# Patient Record
Sex: FEMALE | Race: WHITE | NOT HISPANIC OR LATINO | Employment: OTHER | URBAN - METROPOLITAN AREA
[De-identification: names, ages, dates, MRNs, and addresses within clinical notes are randomized per-mention and may not be internally consistent; named-entity substitution may affect disease eponyms.]

---

## 2017-02-10 ENCOUNTER — OFFICE VISIT (OUTPATIENT)
Dept: FAMILY MEDICINE CLINIC | Facility: CLINIC | Age: 52
End: 2017-02-10

## 2017-02-10 VITALS
OXYGEN SATURATION: 98 % | DIASTOLIC BLOOD PRESSURE: 70 MMHG | BODY MASS INDEX: 26.32 KG/M2 | TEMPERATURE: 98 F | HEART RATE: 78 BPM | HEIGHT: 61 IN | SYSTOLIC BLOOD PRESSURE: 106 MMHG | WEIGHT: 139.4 LBS

## 2017-02-10 DIAGNOSIS — Z91.09 ENVIRONMENTAL ALLERGIES: Primary | ICD-10-CM

## 2017-02-10 DIAGNOSIS — Z00.00 ROUTINE GENERAL MEDICAL EXAMINATION AT A HEALTH CARE FACILITY: ICD-10-CM

## 2017-02-10 DIAGNOSIS — G43.909 MIGRAINE WITHOUT STATUS MIGRAINOSUS, NOT INTRACTABLE, UNSPECIFIED MIGRAINE TYPE: ICD-10-CM

## 2017-02-10 DIAGNOSIS — K21.9 GASTROESOPHAGEAL REFLUX DISEASE WITHOUT ESOPHAGITIS: ICD-10-CM

## 2017-02-10 DIAGNOSIS — E03.9 ADULT HYPOTHYROIDISM: ICD-10-CM

## 2017-02-10 LAB
ALBUMIN SERPL-MCNC: 4.3 G/DL (ref 3.5–5.2)
ALBUMIN/GLOB SERPL: 1.7 G/DL
ALP SERPL-CCNC: 101 U/L (ref 39–117)
ALT SERPL-CCNC: 36 U/L (ref 1–33)
AST SERPL-CCNC: 28 U/L (ref 1–32)
BASOPHILS # BLD AUTO: 0.03 10*3/MM3 (ref 0–0.2)
BASOPHILS NFR BLD AUTO: 0.5 % (ref 0–1.5)
BILIRUB SERPL-MCNC: 1.3 MG/DL (ref 0.1–1.2)
BUN SERPL-MCNC: 10 MG/DL (ref 6–20)
BUN/CREAT SERPL: 12.2 (ref 7–25)
CALCIUM SERPL-MCNC: 10 MG/DL (ref 8.6–10.5)
CHLORIDE SERPL-SCNC: 100 MMOL/L (ref 98–107)
CHOLEST SERPL-MCNC: 274 MG/DL (ref 0–200)
CO2 SERPL-SCNC: 28.9 MMOL/L (ref 22–29)
CREAT SERPL-MCNC: 0.82 MG/DL (ref 0.57–1)
EOSINOPHIL # BLD AUTO: 0.46 10*3/MM3 (ref 0–0.7)
EOSINOPHIL NFR BLD AUTO: 7.6 % (ref 0.3–6.2)
ERYTHROCYTE [DISTWIDTH] IN BLOOD BY AUTOMATED COUNT: 13 % (ref 11.7–13)
GLOBULIN SER CALC-MCNC: 2.5 GM/DL
GLUCOSE SERPL-MCNC: 85 MG/DL (ref 65–99)
HCT VFR BLD AUTO: 43.4 % (ref 35.6–45.5)
HDLC SERPL-MCNC: 70 MG/DL (ref 40–60)
HGB BLD-MCNC: 14.2 G/DL (ref 11.9–15.5)
IMM GRANULOCYTES # BLD: 0.02 10*3/MM3 (ref 0–0.03)
IMM GRANULOCYTES NFR BLD: 0.3 % (ref 0–0.5)
LDLC SERPL CALC-MCNC: 191 MG/DL (ref 0–100)
LDLC/HDLC SERPL: 2.73 {RATIO}
LYMPHOCYTES # BLD AUTO: 1.81 10*3/MM3 (ref 0.9–4.8)
LYMPHOCYTES NFR BLD AUTO: 30 % (ref 19.6–45.3)
MCH RBC QN AUTO: 29.6 PG (ref 26.9–32)
MCHC RBC AUTO-ENTMCNC: 32.7 G/DL (ref 32.4–36.3)
MCV RBC AUTO: 90.6 FL (ref 80.5–98.2)
MONOCYTES # BLD AUTO: 0.52 10*3/MM3 (ref 0.2–1.2)
MONOCYTES NFR BLD AUTO: 8.6 % (ref 5–12)
NEUTROPHILS # BLD AUTO: 3.19 10*3/MM3 (ref 1.9–8.1)
NEUTROPHILS NFR BLD AUTO: 53 % (ref 42.7–76)
PLATELET # BLD AUTO: 242 10*3/MM3 (ref 140–500)
POTASSIUM SERPL-SCNC: 4.2 MMOL/L (ref 3.5–5.2)
PROT SERPL-MCNC: 6.8 G/DL (ref 6–8.5)
RBC # BLD AUTO: 4.79 10*6/MM3 (ref 3.9–5.2)
SODIUM SERPL-SCNC: 140 MMOL/L (ref 136–145)
TRIGL SERPL-MCNC: 64 MG/DL (ref 0–150)
TSH SERPL DL<=0.005 MIU/L-ACNC: 0.46 MIU/ML (ref 0.27–4.2)
VLDLC SERPL CALC-MCNC: 12.8 MG/DL (ref 5–40)
WBC # BLD AUTO: 6.03 10*3/MM3 (ref 4.5–10.7)

## 2017-02-10 PROCEDURE — 99214 OFFICE O/P EST MOD 30 MIN: CPT | Performed by: NURSE PRACTITIONER

## 2017-02-10 RX ORDER — AZELASTINE 1 MG/ML
SPRAY, METERED NASAL
COMMUNITY
Start: 2013-04-12 | End: 2017-02-10 | Stop reason: SDUPTHER

## 2017-02-10 RX ORDER — AMPICILLIN TRIHYDRATE 250 MG
CAPSULE ORAL
COMMUNITY
Start: 2013-01-24 | End: 2018-04-03

## 2017-02-10 RX ORDER — LEVOCETIRIZINE DIHYDROCHLORIDE 5 MG/1
TABLET, FILM COATED ORAL
COMMUNITY
Start: 2013-01-24 | End: 2017-09-12 | Stop reason: SDUPTHER

## 2017-02-10 RX ORDER — OMEPRAZOLE 20 MG/1
20 CAPSULE, DELAYED RELEASE ORAL DAILY
Qty: 30 CAPSULE | Refills: 3 | Status: SHIPPED | OUTPATIENT
Start: 2017-02-10 | End: 2017-05-13 | Stop reason: SDUPTHER

## 2017-02-10 RX ORDER — BUTALBITAL, ACETAMINOPHEN AND CAFFEINE 50; 325; 40 MG/1; MG/1; MG/1
1 CAPSULE ORAL AS NEEDED
COMMUNITY

## 2017-02-10 RX ORDER — LEVOTHYROXINE SODIUM 0.07 MG/1
TABLET ORAL
COMMUNITY
Start: 2013-01-24 | End: 2017-03-24 | Stop reason: SDUPTHER

## 2017-02-10 RX ORDER — AZELASTINE 1 MG/ML
1 SPRAY, METERED NASAL 2 TIMES DAILY
Qty: 3 EACH | Refills: 3 | Status: SHIPPED | OUTPATIENT
Start: 2017-02-10 | End: 2018-03-28 | Stop reason: SDUPTHER

## 2017-02-10 NOTE — PROGRESS NOTES
"Subjective   Zi Jasso is a 51 y.o. female.     History of Present Illness   Zi Jasso 51 y.o. female who presents today for a new patient appointment.    she has a history of   Patient Active Problem List   Diagnosis   • Adult hypothyroidism   • Environmental allergies   • Migraine without status migrainosus, not intractable   • Gastroesophageal reflux disease without esophagitis   .  I reviewed the PFSH recorded today by my MA/LPN staff.   she is here to establish care.  She has been feeling well.    Sore Throat: Patient complains of sore throat. Associated symptoms include none.Onset of symptoms was 3 weeks ago, unchanged since that time. She is drinking plenty of fluids. She has not had recent close exposure to someone with proven streptococcal pharyngitis.  She does not have a hx of GERD.  It is consistent throughout the day and doesn't know if gets worse at night.     Hypothyroidism: Patient presents for evaluation of thyroid function. Symptoms consist of denies fatigue, weight changes, heat/cold intolerance, bowel/skin changes or CVS symptoms. Symptoms have present for several years. The symptoms are no.  The problem has been stable.  Previous thyroid studies include TSH. The hypothyroidism is due to hypothyroidism and Hashimoto's disease.    Also occasionally takes medication for migraines but hasn't had one in a very long time    Allergies are a big concern with her and she is taking Astelin which is helping     The following portions of the patient's history were reviewed and updated as appropriate: allergies, current medications, past social history and problem list.    Review of Systems   HENT: Positive for sore throat.    All other systems reviewed and are negative.      Objective   Visit Vitals   • /70 (BP Location: Right arm, Patient Position: Sitting)   • Pulse 78   • Temp 98 °F (36.7 °C)   • Ht 60.75\" (154.3 cm)   • Wt 139 lb 6.4 oz (63.2 kg)   • SpO2 98%   • BMI 26.56 kg/m2     Physical Exam "   Constitutional: She is oriented to person, place, and time. Vital signs are normal. She appears well-developed and well-nourished. No distress.   HENT:   Head: Normocephalic.   Right Ear: Tympanic membrane normal.   Left Ear: Tympanic membrane normal.   Nose: Nose normal.   Mouth/Throat: Oropharynx is clear and moist and mucous membranes are normal.   Cardiovascular: Normal rate, regular rhythm and normal heart sounds.    Pulmonary/Chest: Effort normal and breath sounds normal.   Lymphadenopathy:     She has no cervical adenopathy.   Neurological: She is alert and oriented to person, place, and time. Gait normal.   Psychiatric: She has a normal mood and affect. Her behavior is normal. Judgment and thought content normal.   Vitals reviewed.      Assessment/Plan   Problem List Items Addressed This Visit        Cardiovascular and Mediastinum    Migraine without status migrainosus, not intractable    Relevant Medications    Butalbital-APAP-Caffeine -40 MG per capsule       Digestive    Gastroesophageal reflux disease without esophagitis    Relevant Medications    omeprazole (PRILOSEC) 20 MG capsule       Endocrine    Adult hypothyroidism    Relevant Medications    levothyroxine (SYNTHROID) 75 MCG tablet    Other Relevant Orders    TSH       Other    Environmental allergies - Primary    Relevant Medications    azelastine (ASTELIN) 0.1 % nasal spray      Other Visit Diagnoses     Routine general medical examination at a health care facility        Relevant Orders    CBC & Differential    Comprehensive Metabolic Panel    Lipid Panel With LDL / HDL Ratio        Follow up after labs  rto in 4 weeks to check gerd med

## 2017-03-02 ENCOUNTER — TELEPHONE (OUTPATIENT)
Dept: FAMILY MEDICINE CLINIC | Facility: CLINIC | Age: 52
End: 2017-03-02

## 2017-03-02 DIAGNOSIS — Z12.31 SCREENING MAMMOGRAM, ENCOUNTER FOR: Primary | ICD-10-CM

## 2017-03-24 ENCOUNTER — HOSPITAL ENCOUNTER (OUTPATIENT)
Dept: MAMMOGRAPHY | Facility: HOSPITAL | Age: 52
Discharge: HOME | End: 2017-03-24
Admitting: NURSE PRACTITIONER

## 2017-03-24 DIAGNOSIS — Z12.31 SCREENING MAMMOGRAM, ENCOUNTER FOR: ICD-10-CM

## 2017-03-24 PROCEDURE — G0202 SCR MAMMO BI INCL CAD: HCPCS

## 2017-03-24 RX ORDER — LEVOTHYROXINE SODIUM 75 MCG
75 TABLET ORAL DAILY
Qty: 90 TABLET | Refills: 3 | Status: SHIPPED | OUTPATIENT
Start: 2017-03-24 | End: 2017-09-01 | Stop reason: SDUPTHER

## 2017-03-30 DIAGNOSIS — R92.2 BREAST DENSITY: Primary | ICD-10-CM

## 2017-04-05 ENCOUNTER — HOSPITAL ENCOUNTER (OUTPATIENT)
Dept: MAMMOGRAPHY | Facility: HOSPITAL | Age: 52
Discharge: HOME | End: 2017-04-05
Admitting: NURSE PRACTITIONER

## 2017-04-05 DIAGNOSIS — R92.2 BREAST DENSITY: ICD-10-CM

## 2017-04-05 PROCEDURE — G0206 DX MAMMO INCL CAD UNI: HCPCS

## 2017-04-29 ENCOUNTER — TELEPHONE (OUTPATIENT)
Dept: SPORTS MEDICINE | Facility: CLINIC | Age: 52
End: 2017-04-29

## 2017-04-29 RX ORDER — CIPROFLOXACIN 250 MG/1
250 TABLET, FILM COATED ORAL 2 TIMES DAILY
Qty: 6 TABLET | Refills: 0 | Status: SHIPPED | OUTPATIENT
Start: 2017-04-29 | End: 2017-09-01

## 2017-04-29 NOTE — TELEPHONE ENCOUNTER
Patient called with symptoms of UTI, going out of town Monday. E-rx'd cipr bid x 3 days, if not improved should be evaluated in person.

## 2017-05-13 DIAGNOSIS — K21.9 GASTROESOPHAGEAL REFLUX DISEASE WITHOUT ESOPHAGITIS: ICD-10-CM

## 2017-05-15 RX ORDER — OMEPRAZOLE 20 MG/1
CAPSULE, DELAYED RELEASE ORAL
Qty: 90 CAPSULE | Refills: 3 | Status: SHIPPED | OUTPATIENT
Start: 2017-05-15 | End: 2017-12-22 | Stop reason: SDUPTHER

## 2017-09-01 ENCOUNTER — OFFICE VISIT (OUTPATIENT)
Dept: FAMILY MEDICINE CLINIC | Facility: CLINIC | Age: 52
End: 2017-09-01

## 2017-09-01 VITALS
HEART RATE: 65 BPM | DIASTOLIC BLOOD PRESSURE: 64 MMHG | TEMPERATURE: 97.6 F | HEIGHT: 61 IN | OXYGEN SATURATION: 100 % | WEIGHT: 137.6 LBS | BODY MASS INDEX: 25.98 KG/M2 | SYSTOLIC BLOOD PRESSURE: 112 MMHG

## 2017-09-01 DIAGNOSIS — J32.9 SINUSITIS, UNSPECIFIED CHRONICITY, UNSPECIFIED LOCATION: Primary | ICD-10-CM

## 2017-09-01 DIAGNOSIS — E03.9 ADULT HYPOTHYROIDISM: ICD-10-CM

## 2017-09-01 PROCEDURE — 96372 THER/PROPH/DIAG INJ SC/IM: CPT | Performed by: NURSE PRACTITIONER

## 2017-09-01 PROCEDURE — 99213 OFFICE O/P EST LOW 20 MIN: CPT | Performed by: NURSE PRACTITIONER

## 2017-09-01 RX ORDER — LEVOTHYROXINE SODIUM 75 MCG
75 TABLET ORAL DAILY
Qty: 90 TABLET | Refills: 3 | Status: SHIPPED | OUTPATIENT
Start: 2017-09-01 | End: 2018-04-03 | Stop reason: SDUPTHER

## 2017-09-01 RX ORDER — TRIAMCINOLONE ACETONIDE 40 MG/ML
40 INJECTION, SUSPENSION INTRA-ARTICULAR; INTRAMUSCULAR ONCE
Status: COMPLETED | OUTPATIENT
Start: 2017-09-01 | End: 2017-09-01

## 2017-09-01 RX ORDER — PROMETHAZINE HYDROCHLORIDE AND CODEINE PHOSPHATE 6.25; 1 MG/5ML; MG/5ML
5 SYRUP ORAL EVERY 4 HOURS PRN
Qty: 280 ML | Refills: 0 | Status: SHIPPED | OUTPATIENT
Start: 2017-09-01 | End: 2017-11-13

## 2017-09-01 RX ORDER — AZITHROMYCIN 250 MG/1
TABLET, FILM COATED ORAL
Qty: 6 TABLET | Refills: 0 | Status: SHIPPED | OUTPATIENT
Start: 2017-09-01 | End: 2017-11-13

## 2017-09-01 RX ADMIN — TRIAMCINOLONE ACETONIDE 40 MG: 40 INJECTION, SUSPENSION INTRA-ARTICULAR; INTRAMUSCULAR at 13:50

## 2017-09-01 NOTE — PROGRESS NOTES
"Subjective   Zi Jasso is a 52 y.o. female.     History of Present Illness   Upper Respiratory Infection: Patient complains of symptoms of a URI, possible sinusitis. Symptoms include congestion, cough, plugged sensation in both ears, sore throat and swollen glands. Onset of symptoms was 9 days ago, gradually worsening since that time. She also c/o facial pain for the past 3 days .  She is drinking plenty of fluids. Evaluation to date: none. Treatment to date: antihistamines.      Also needing thyroid med refilled. Doing well with that   The following portions of the patient's history were reviewed and updated as appropriate: allergies, current medications, past social history and problem list.    Review of Systems   HENT: Positive for congestion, postnasal drip and sore throat.    Respiratory: Positive for cough.    All other systems reviewed and are negative.      Objective   /64 (BP Location: Right arm, Patient Position: Sitting)  Pulse 65  Temp 97.6 °F (36.4 °C)  Ht 60.75\" (154.3 cm)  Wt 137 lb 9.6 oz (62.4 kg)  SpO2 100%  BMI 26.21 kg/m2  Physical Exam   Constitutional: She is oriented to person, place, and time. Vital signs are normal. She appears well-developed and well-nourished. No distress.   HENT:   Head: Normocephalic.   Cardiovascular: Normal rate, regular rhythm and normal heart sounds.    Pulmonary/Chest: Effort normal and breath sounds normal.   Neurological: She is alert and oriented to person, place, and time. Gait normal.   Psychiatric: She has a normal mood and affect. Her behavior is normal. Judgment and thought content normal.   Vitals reviewed.      Assessment/Plan   Problem List Items Addressed This Visit        Respiratory    Sinusitis - Primary    Relevant Medications    azithromycin (ZITHROMAX Z-ROYAL) 250 MG tablet    promethazine-codeine (PHENERGAN with CODEINE) 6.25-10 MG/5ML syrup    triamcinolone acetonide (KENALOG-40) injection 40 mg (Start on 9/1/2017  2:15 PM)       " Endocrine    Adult hypothyroidism    Relevant Medications    SYNTHROID 75 MCG tablet           rto prn

## 2017-09-12 RX ORDER — LEVOCETIRIZINE DIHYDROCHLORIDE 5 MG/1
5 TABLET, FILM COATED ORAL DAILY
Qty: 90 TABLET | Refills: 3 | Status: SHIPPED | OUTPATIENT
Start: 2017-09-12

## 2017-11-13 ENCOUNTER — OFFICE VISIT (OUTPATIENT)
Dept: FAMILY MEDICINE CLINIC | Facility: CLINIC | Age: 52
End: 2017-11-13

## 2017-11-13 VITALS
HEART RATE: 74 BPM | TEMPERATURE: 97.6 F | BODY MASS INDEX: 26.21 KG/M2 | SYSTOLIC BLOOD PRESSURE: 104 MMHG | DIASTOLIC BLOOD PRESSURE: 68 MMHG | OXYGEN SATURATION: 99 % | HEIGHT: 61 IN | WEIGHT: 138.8 LBS

## 2017-11-13 DIAGNOSIS — N64.4 BREAST PAIN: ICD-10-CM

## 2017-11-13 DIAGNOSIS — J32.9 SINUSITIS, UNSPECIFIED CHRONICITY, UNSPECIFIED LOCATION: Primary | ICD-10-CM

## 2017-11-13 PROCEDURE — 99214 OFFICE O/P EST MOD 30 MIN: CPT | Performed by: NURSE PRACTITIONER

## 2017-11-13 RX ORDER — PROMETHAZINE HYDROCHLORIDE AND CODEINE PHOSPHATE 6.25; 1 MG/5ML; MG/5ML
5 SYRUP ORAL EVERY 4 HOURS PRN
Qty: 240 ML | Refills: 0 | Status: SHIPPED | OUTPATIENT
Start: 2017-11-13 | End: 2017-11-20

## 2017-11-13 RX ORDER — DOXYCYCLINE HYCLATE 100 MG
100 TABLET ORAL 2 TIMES DAILY
Qty: 20 TABLET | Refills: 0 | Status: SHIPPED | OUTPATIENT
Start: 2017-11-13 | End: 2018-03-12

## 2017-11-13 RX ORDER — METHYLPREDNISOLONE 4 MG/1
TABLET ORAL
Qty: 1 EACH | Refills: 0 | Status: SHIPPED | OUTPATIENT
Start: 2017-11-13 | End: 2017-11-20

## 2017-11-20 ENCOUNTER — OFFICE VISIT (OUTPATIENT)
Dept: FAMILY MEDICINE CLINIC | Facility: CLINIC | Age: 52
End: 2017-11-20

## 2017-11-20 VITALS
OXYGEN SATURATION: 98 % | SYSTOLIC BLOOD PRESSURE: 130 MMHG | HEART RATE: 61 BPM | DIASTOLIC BLOOD PRESSURE: 66 MMHG | BODY MASS INDEX: 26.39 KG/M2 | HEIGHT: 61 IN | TEMPERATURE: 98.3 F | WEIGHT: 139.8 LBS

## 2017-11-20 DIAGNOSIS — R07.9 CHEST PAIN, UNSPECIFIED TYPE: ICD-10-CM

## 2017-11-20 DIAGNOSIS — N64.4 BREAST PAIN: ICD-10-CM

## 2017-11-20 DIAGNOSIS — E78.5 HYPERLIPIDEMIA, UNSPECIFIED HYPERLIPIDEMIA TYPE: Primary | ICD-10-CM

## 2017-11-20 PROCEDURE — 71020 XR CHEST PA AND LATERAL: CPT | Performed by: NURSE PRACTITIONER

## 2017-11-20 PROCEDURE — 99214 OFFICE O/P EST MOD 30 MIN: CPT | Performed by: NURSE PRACTITIONER

## 2017-11-20 NOTE — PROGRESS NOTES
"Subjective   Zi Jasso is a 52 y.o. female.     History of Present Illness   Patient presenting back to the office today to follow-up on chest pain pressure and sinus infection I saw her for about 2 weeks ago.  Patient states that the pain in her left breast is no longer reproducible when she pushes on it but it is still present under her left breast and going over towards the right.  She states she has not exerted herself at all so she doesn't know if he gets worse with exertion but she has walked upstairs and does not get short of breath or chest pain with that.  Patient states that the pain still comes and goes she's not having any heartburn or acid reflux.  The following portions of the patient's history were reviewed and updated as appropriate: allergies, current medications, past social history and problem list.    Review of Systems   HENT: Positive for congestion, postnasal drip and sinus pressure.    Cardiovascular: Positive for chest pain.   All other systems reviewed and are negative.      Objective   /66 (BP Location: Left arm, Patient Position: Sitting)  Pulse 61  Temp 98.3 °F (36.8 °C)  Ht 60.75\" (154.3 cm)  Wt 139 lb 12.8 oz (63.4 kg)  SpO2 98%  BMI 26.63 kg/m2  Physical Exam   Constitutional: She is oriented to person, place, and time. Vital signs are normal. She appears well-developed and well-nourished. No distress.   HENT:   Head: Normocephalic.   Cardiovascular: Normal rate, regular rhythm and normal heart sounds.    Pulmonary/Chest: Effort normal and breath sounds normal.   Neurological: She is alert and oriented to person, place, and time. Gait normal.   Psychiatric: She has a normal mood and affect. Her behavior is normal. Judgment and thought content normal.   Vitals reviewed.    Xray- chest    Findings-normal  No comparison    Assessment/Plan   Problem List Items Addressed This Visit     None      Visit Diagnoses     Hyperlipidemia, unspecified hyperlipidemia type    -  Primary    " Relevant Orders    Lipid Panel With LDL / HDL Ratio    Chest pain, unspecified type        Relevant Orders    XR Chest PA & Lateral           patient is return to the office tomorrow fasting for lipid panel.  Patient is to go to the emergency room or report back here if she has any chest pain or shortness breath with exertion.  I was unable to get an EKG in the office today due to not having the proper supplies.  I would like to go ahead and proceed with a breast MRI follow-up after that.

## 2017-11-21 LAB
CHOLEST SERPL-MCNC: 315 MG/DL (ref 0–200)
HDLC SERPL-MCNC: 78 MG/DL (ref 40–60)
LDLC SERPL CALC-MCNC: 224 MG/DL (ref 0–100)
LDLC/HDLC SERPL: 2.87 {RATIO}
TRIGL SERPL-MCNC: 66 MG/DL (ref 0–150)
VLDLC SERPL CALC-MCNC: 13.2 MG/DL (ref 5–40)

## 2017-11-22 RX ORDER — ROSUVASTATIN CALCIUM 20 MG/1
20 TABLET, COATED ORAL DAILY
Qty: 90 TABLET | Refills: 3 | Status: SHIPPED | OUTPATIENT
Start: 2017-11-22 | End: 2018-03-12

## 2017-12-11 ENCOUNTER — HOSPITAL ENCOUNTER (OUTPATIENT)
Dept: MRI IMAGING | Facility: HOSPITAL | Age: 52
Discharge: HOME | End: 2017-12-11
Admitting: NURSE PRACTITIONER

## 2017-12-11 DIAGNOSIS — N64.4 BREAST PAIN: ICD-10-CM

## 2017-12-11 DIAGNOSIS — N64.4 BREAST PAIN: Primary | ICD-10-CM

## 2017-12-11 PROCEDURE — A9577 INJ MULTIHANCE: HCPCS | Performed by: NURSE PRACTITIONER

## 2017-12-11 PROCEDURE — 0159T HC MRI BREAST COMPUTER ANALYSIS: CPT

## 2017-12-11 PROCEDURE — 0 GADOBENATE DIMEGLUMINE 529 MG/ML SOLUTION: Performed by: NURSE PRACTITIONER

## 2017-12-11 PROCEDURE — 82565 ASSAY OF CREATININE: CPT

## 2017-12-11 PROCEDURE — C8908 MRI W/O FOL W/CONT, BREAST,: HCPCS

## 2017-12-11 RX ADMIN — GADOBENATE DIMEGLUMINE 12 ML: 529 INJECTION, SOLUTION INTRAVENOUS at 16:24

## 2017-12-12 LAB — CREAT BLDA-MCNC: 0.8 MG/DL (ref 0.6–1.3)

## 2017-12-22 DIAGNOSIS — K21.9 GASTROESOPHAGEAL REFLUX DISEASE WITHOUT ESOPHAGITIS: ICD-10-CM

## 2017-12-22 RX ORDER — OMEPRAZOLE 20 MG/1
20 CAPSULE, DELAYED RELEASE ORAL DAILY
Qty: 90 CAPSULE | Refills: 3 | Status: SHIPPED | OUTPATIENT
Start: 2017-12-22 | End: 2018-03-12 | Stop reason: ALTCHOICE

## 2018-02-13 ENCOUNTER — TELEPHONE (OUTPATIENT)
Dept: FAMILY MEDICINE CLINIC | Facility: CLINIC | Age: 53
End: 2018-02-13

## 2018-02-13 NOTE — TELEPHONE ENCOUNTER
Follow up call with patient     Patient still having concerns with chest pain and would like to be referred for this. Patient requesting names only as she would like to schedule her own appointment.    Please advise.

## 2018-03-12 ENCOUNTER — OFFICE VISIT (OUTPATIENT)
Dept: FAMILY MEDICINE CLINIC | Facility: CLINIC | Age: 53
End: 2018-03-12

## 2018-03-12 VITALS
TEMPERATURE: 98 F | WEIGHT: 136 LBS | BODY MASS INDEX: 25.68 KG/M2 | HEIGHT: 61 IN | OXYGEN SATURATION: 98 % | SYSTOLIC BLOOD PRESSURE: 104 MMHG | DIASTOLIC BLOOD PRESSURE: 64 MMHG | HEART RATE: 84 BPM

## 2018-03-12 DIAGNOSIS — R07.9 CHEST PAIN, UNSPECIFIED TYPE: Primary | ICD-10-CM

## 2018-03-12 DIAGNOSIS — E78.5 HYPERLIPIDEMIA, UNSPECIFIED HYPERLIPIDEMIA TYPE: ICD-10-CM

## 2018-03-12 PROBLEM — J32.9 SINUSITIS: Status: RESOLVED | Noted: 2017-09-01 | Resolved: 2018-03-12

## 2018-03-12 PROBLEM — N64.4 BREAST PAIN: Status: RESOLVED | Noted: 2017-11-13 | Resolved: 2018-03-12

## 2018-03-12 PROCEDURE — 99214 OFFICE O/P EST MOD 30 MIN: CPT | Performed by: NURSE PRACTITIONER

## 2018-03-12 PROCEDURE — 93000 ELECTROCARDIOGRAM COMPLETE: CPT | Performed by: NURSE PRACTITIONER

## 2018-03-12 RX ORDER — ROSUVASTATIN CALCIUM 40 MG/1
40 TABLET, COATED ORAL DAILY
Qty: 90 TABLET | Refills: 3 | Status: SHIPPED | OUTPATIENT
Start: 2018-03-12 | End: 2018-04-03

## 2018-03-12 RX ORDER — OMEPRAZOLE 40 MG/1
40 CAPSULE, DELAYED RELEASE ORAL DAILY
Qty: 90 CAPSULE | Refills: 3 | Status: SHIPPED | OUTPATIENT
Start: 2018-03-12 | End: 2018-04-03 | Stop reason: SINTOL

## 2018-03-12 NOTE — PROGRESS NOTES
"Subjective   Zi Jasso is a 52 y.o. female.     History of Present Illness   Patient presenting to the office today with continued and ongoing chest pain and pressure that she's had since November when I last saw her for this.  She did not start the Crestor medication or follow-up regarding that when I spoke to her about her elevated cholesterol 3:15 back in November.  She does state she's doing red yeast rice causes her to have muscle aches that she doesn't do it consistently.  She states high cholesterol does run in her family.  She states the Prilosec helps with a sore throat but isn't nothing to help with the chest pressure.  Today she states that the chest pressure does get worse with exertion like walking up stairs.  Last visit records were reviewed.  I was unable to do EKG in office at that time because of lack of supplies.  Chest x-ray at that visit was normal.    The following portions of the patient's history were reviewed and updated as appropriate: allergies, current medications, past social history and problem list.    Review of Systems   Respiratory: Positive for cough and shortness of breath.    All other systems reviewed and are negative.      Objective   /64 (BP Location: Right arm, Patient Position: Sitting)   Pulse 84   Temp 98 °F (36.7 °C)   Ht 154.3 cm (60.75\")   Wt 61.7 kg (136 lb)   SpO2 98%   BMI 25.91 kg/m²   Physical Exam   Constitutional: She is oriented to person, place, and time. Vital signs are normal. She appears well-developed and well-nourished. No distress.   HENT:   Head: Normocephalic.   Cardiovascular: Normal rate, regular rhythm and normal heart sounds.    Pulmonary/Chest: Effort normal and breath sounds normal.   Neurological: She is alert and oriented to person, place, and time. Gait normal.   Psychiatric: She has a normal mood and affect. Her behavior is normal. Judgment and thought content normal.   Vitals reviewed.      ECG 12 Lead  Date/Time: 3/12/2018 11:15 " AM  Performed by: LASHAWN CRUZ  Authorized by: LASHAWN CRUZ   Comparison: not compared with previous ECG   Previous ECG: no previous ECG available  Rhythm: sinus rhythm  Rate: normal  Conduction: conduction normal  ST Segments: ST segments normal  T Waves: T waves normal  QRS axis: normal  Other: no other findings  Clinical impression: normal ECG            Assessment/Plan   Problem List Items Addressed This Visit        Cardiovascular and Mediastinum    Hyperlipidemia       Nervous and Auditory    Chest pain - Primary    Relevant Orders    ECG 12 Lead    Treadmill Stress Test      Other Visit Diagnoses    None.       I've talked at length regarding the need for statin medication. She is willing to try.  Recheck lipids in 3 mons  No exercising until after stress test  Increase prilosec to 40mg

## 2018-03-15 ENCOUNTER — HOSPITAL ENCOUNTER (OUTPATIENT)
Dept: CARDIOLOGY | Facility: HOSPITAL | Age: 53
Discharge: HOME | End: 2018-03-15
Admitting: NURSE PRACTITIONER

## 2018-03-15 ENCOUNTER — TELEPHONE (OUTPATIENT)
Dept: FAMILY MEDICINE CLINIC | Facility: CLINIC | Age: 53
End: 2018-03-15

## 2018-03-15 DIAGNOSIS — R07.9 CHEST PAIN, UNSPECIFIED TYPE: ICD-10-CM

## 2018-03-15 LAB
BH CV STRESS BP STAGE 1: NORMAL
BH CV STRESS BP STAGE 2: NORMAL
BH CV STRESS BP STAGE 3: NORMAL
BH CV STRESS BP STAGE 4: NORMAL
BH CV STRESS DURATION MIN STAGE 1: 3
BH CV STRESS DURATION MIN STAGE 2: 3
BH CV STRESS DURATION MIN STAGE 3: 3
BH CV STRESS DURATION MIN STAGE 4: 1
BH CV STRESS DURATION SEC STAGE 1: 0
BH CV STRESS DURATION SEC STAGE 2: 0
BH CV STRESS DURATION SEC STAGE 3: 0
BH CV STRESS DURATION SEC STAGE 4: 20
BH CV STRESS GRADE STAGE 1: 10
BH CV STRESS GRADE STAGE 2: 12
BH CV STRESS GRADE STAGE 3: 14
BH CV STRESS GRADE STAGE 4: 16
BH CV STRESS HR STAGE 1: 104
BH CV STRESS HR STAGE 2: 119
BH CV STRESS HR STAGE 3: 127
BH CV STRESS HR STAGE 4: 156
BH CV STRESS METS STAGE 1: 5
BH CV STRESS METS STAGE 2: 7.5
BH CV STRESS METS STAGE 3: 10
BH CV STRESS METS STAGE 4: 13.5
BH CV STRESS PROTOCOL 1: NORMAL
BH CV STRESS RECOVERY BP: NORMAL MMHG
BH CV STRESS RECOVERY HR: 92 BPM
BH CV STRESS SPEED STAGE 1: 1.7
BH CV STRESS SPEED STAGE 2: 2.5
BH CV STRESS SPEED STAGE 3: 3.4
BH CV STRESS SPEED STAGE 4: 4.2
BH CV STRESS STAGE 1: 1
BH CV STRESS STAGE 2: 2
BH CV STRESS STAGE 3: 3
BH CV STRESS STAGE 4: 4
MAXIMAL PREDICTED HEART RATE: 167 BPM
PERCENT MAX PREDICTED HR: 93.41 %
STRESS BASELINE BP: NORMAL MMHG
STRESS BASELINE HR: 64 BPM
STRESS PERCENT HR: 110 %
STRESS POST ESTIMATED WORKLOAD: 11.1 METS
STRESS POST EXERCISE DUR MIN: 10 MIN
STRESS POST EXERCISE DUR SEC: 20 SEC
STRESS POST PEAK BP: NORMAL MMHG
STRESS POST PEAK HR: 156 BPM
STRESS TARGET HR: 142 BPM

## 2018-03-15 PROCEDURE — 93017 CV STRESS TEST TRACING ONLY: CPT

## 2018-03-15 PROCEDURE — 93016 CV STRESS TEST SUPVJ ONLY: CPT | Performed by: INTERNAL MEDICINE

## 2018-03-15 PROCEDURE — 93018 CV STRESS TEST I&R ONLY: CPT | Performed by: INTERNAL MEDICINE

## 2018-03-15 NOTE — TELEPHONE ENCOUNTER
Patient received her stress results since they are normal patient wants to know if she can start exercising again? Is there anything else she needs to do?

## 2018-03-16 DIAGNOSIS — R07.9 CHEST PAIN, EXERTIONAL: Primary | ICD-10-CM

## 2018-03-16 RX ORDER — PANTOPRAZOLE SODIUM 40 MG/1
40 TABLET, DELAYED RELEASE ORAL DAILY
Qty: 90 TABLET | Refills: 3 | Status: SHIPPED | OUTPATIENT
Start: 2018-03-16 | End: 2018-04-03 | Stop reason: SINTOL

## 2018-03-16 NOTE — TELEPHONE ENCOUNTER
We really don't need to repeat labs until she has been on the statin for 3 months.  She needs to cont that med and lets switch the prilosec to protonix 40mg #90 1 po qd rf 3   I suggest referring to pulmonary to do testing for the chest pain with exertion

## 2018-03-16 NOTE — TELEPHONE ENCOUNTER
Called and spoke to patient and told her what claudine said referral is in and prescription sent to pharmacy

## 2018-03-16 NOTE — TELEPHONE ENCOUNTER
Patient wants to make sure that claudine does want to switch the stomach medicine and not the cholesterol medication because she says the cholesterol medication is the one that is new and the one she thinks is causing the problems she said the prilosec has been doing fine

## 2018-03-16 NOTE — TELEPHONE ENCOUNTER
Your chest pain can be coming from GERD so I want to switch your med to something stronger to see if it helps your chest pain.  If you can please cont with cholesterol med bc it usually doesn't have that SE and if still bothering you after 2 weeks call

## 2018-03-16 NOTE — TELEPHONE ENCOUNTER
Patient is wanting to go ahead and investigate the problem. She is wanting to know if she can come in today just to have labs drawn. Patient states the new medication claudine put her on she thinks is causing more issues than helping. Her stomach is very gassy and cramping

## 2018-03-26 RX ORDER — LEVOTHYROXINE SODIUM 75 MCG
75 TABLET ORAL DAILY
Qty: 90 TABLET | Refills: 0 | Status: SHIPPED | OUTPATIENT
Start: 2018-03-26 | End: 2018-08-15

## 2018-03-28 DIAGNOSIS — Z91.09 ENVIRONMENTAL ALLERGIES: ICD-10-CM

## 2018-03-28 RX ORDER — AZELASTINE 1 MG/ML
SPRAY, METERED NASAL
Qty: 90 ML | Refills: 0 | Status: SHIPPED | OUTPATIENT
Start: 2018-03-28 | End: 2019-12-02 | Stop reason: SDUPTHER

## 2018-04-03 ENCOUNTER — OFFICE VISIT (OUTPATIENT)
Dept: FAMILY MEDICINE CLINIC | Facility: CLINIC | Age: 53
End: 2018-04-03

## 2018-04-03 VITALS
SYSTOLIC BLOOD PRESSURE: 108 MMHG | DIASTOLIC BLOOD PRESSURE: 66 MMHG | BODY MASS INDEX: 25.75 KG/M2 | OXYGEN SATURATION: 97 % | HEIGHT: 61 IN | TEMPERATURE: 97.9 F | HEART RATE: 69 BPM | WEIGHT: 136.4 LBS

## 2018-04-03 DIAGNOSIS — E78.5 HYPERLIPIDEMIA, UNSPECIFIED HYPERLIPIDEMIA TYPE: Primary | ICD-10-CM

## 2018-04-03 DIAGNOSIS — R31.9 URINARY TRACT INFECTION WITH HEMATURIA, SITE UNSPECIFIED: ICD-10-CM

## 2018-04-03 DIAGNOSIS — N39.0 URINARY TRACT INFECTION WITH HEMATURIA, SITE UNSPECIFIED: ICD-10-CM

## 2018-04-03 DIAGNOSIS — E03.9 ADULT HYPOTHYROIDISM: ICD-10-CM

## 2018-04-03 LAB
BILIRUB BLD-MCNC: NEGATIVE MG/DL
CLARITY, POC: CLEAR
COLOR UR: YELLOW
GLUCOSE UR STRIP-MCNC: NEGATIVE MG/DL
KETONES UR QL: NEGATIVE
LEUKOCYTE EST, POC: ABNORMAL
NITRITE UR-MCNC: NEGATIVE MG/ML
PH UR: 5.5 [PH] (ref 5–8)
PROT UR STRIP-MCNC: NEGATIVE MG/DL
RBC # UR STRIP: ABNORMAL /UL
SP GR UR: 1.03 (ref 1–1.03)
TSH SERPL DL<=0.005 MIU/L-ACNC: 0.29 MIU/ML (ref 0.27–4.2)
UROBILINOGEN UR QL: NORMAL

## 2018-04-03 PROCEDURE — 81003 URINALYSIS AUTO W/O SCOPE: CPT | Performed by: NURSE PRACTITIONER

## 2018-04-03 PROCEDURE — 99213 OFFICE O/P EST LOW 20 MIN: CPT | Performed by: NURSE PRACTITIONER

## 2018-04-03 RX ORDER — SULFAMETHOXAZOLE AND TRIMETHOPRIM 800; 160 MG/1; MG/1
1 TABLET ORAL 2 TIMES DAILY
Qty: 10 TABLET | Refills: 0 | Status: SHIPPED | OUTPATIENT
Start: 2018-04-03 | End: 2018-06-21

## 2018-04-03 NOTE — PROGRESS NOTES
"Subjective   Zi Jasso is a 52 y.o. female.     History of Present Illness   Patient presenting to the office today to follow-up on chest pain.  To this date we have done an x-ray of her chest which was normal treadmill stress test which was normal and EKG which was normal she has a pulmonary consultation next week.  She states the chest pain is getting better.  She is completely stopped taking her Prilosec and her cholesterol medication due to side effects.  She was unable to tolerate the cholesterol medication due to multiple muscle aches throughout her body.    She's also been having some burning with urination for the past 4 days of fever no back pain  The following portions of the patient's history were reviewed and updated as appropriate: allergies, current medications, past social history and problem list.    Review of Systems   Cardiovascular: Positive for chest pain.   All other systems reviewed and are negative.      Objective   /66 (BP Location: Left arm, Patient Position: Sitting)   Pulse 69   Temp 97.9 °F (36.6 °C)   Ht 154.3 cm (60.75\")   Wt 61.9 kg (136 lb 6.4 oz)   SpO2 97%   BMI 25.99 kg/m²   Physical Exam   Constitutional: She is oriented to person, place, and time. Vital signs are normal. She appears well-developed and well-nourished. No distress.   HENT:   Head: Normocephalic.   Cardiovascular: Normal rate, regular rhythm and normal heart sounds.    Pulmonary/Chest: Effort normal and breath sounds normal.   Neurological: She is alert and oriented to person, place, and time. Gait normal.   Psychiatric: She has a normal mood and affect. Her behavior is normal. Judgment and thought content normal.   Vitals reviewed.      Assessment/Plan   Problem List Items Addressed This Visit        Cardiovascular and Mediastinum    Hyperlipidemia - Primary       Endocrine    Adult hypothyroidism    Relevant Orders    TSH       Genitourinary    Urinary tract infection with hematuria    Relevant " Medications    sulfamethoxazole-trimethoprim (BACTRIM DS) 800-160 MG per tablet    Other Relevant Orders    POC Urinalysis Dipstick, Automated      Other Visit Diagnoses    None.       Follow-up after lab work.  I discussed at length the need to get her on a cholesterol medication and we will try the new med Repatha.  She is in agreement with this.

## 2018-04-09 ENCOUNTER — TELEPHONE (OUTPATIENT)
Dept: FAMILY MEDICINE CLINIC | Facility: CLINIC | Age: 53
End: 2018-04-09

## 2018-04-09 NOTE — TELEPHONE ENCOUNTER
FYI    Form sent to the office from [facility] to complete and signed before faxing back to pharmacy.

## 2018-04-23 DIAGNOSIS — B37.9 YEAST INFECTION: Primary | ICD-10-CM

## 2018-04-23 RX ORDER — FLUCONAZOLE 150 MG/1
150 TABLET ORAL ONCE
Qty: 1 TABLET | Refills: 0 | Status: SHIPPED | OUTPATIENT
Start: 2018-04-23 | End: 2018-04-23

## 2018-05-24 ENCOUNTER — TELEPHONE (OUTPATIENT)
Dept: FAMILY MEDICINE CLINIC | Facility: CLINIC | Age: 53
End: 2018-05-24

## 2018-05-24 DIAGNOSIS — E78.5 HYPERLIPIDEMIA, UNSPECIFIED HYPERLIPIDEMIA TYPE: Primary | ICD-10-CM

## 2018-05-24 DIAGNOSIS — Z00.00 ROUTINE GENERAL MEDICAL EXAMINATION AT HEALTH CARE FACILITY: ICD-10-CM

## 2018-06-01 ENCOUNTER — RESULTS ENCOUNTER (OUTPATIENT)
Dept: FAMILY MEDICINE CLINIC | Facility: CLINIC | Age: 53
End: 2018-06-01

## 2018-06-01 DIAGNOSIS — Z00.00 ROUTINE GENERAL MEDICAL EXAMINATION AT HEALTH CARE FACILITY: ICD-10-CM

## 2018-06-01 DIAGNOSIS — E78.5 HYPERLIPIDEMIA, UNSPECIFIED HYPERLIPIDEMIA TYPE: ICD-10-CM

## 2018-06-01 NOTE — TELEPHONE ENCOUNTER
In the email it states to send labs from the last 90 days the only lab she has had was in April is was a tsh. Do you want her to come back in for lipids and anything else?

## 2018-06-05 LAB
ALBUMIN SERPL-MCNC: 3.9 G/DL (ref 3.5–5.2)
ALBUMIN/GLOB SERPL: 1.4 G/DL
ALP SERPL-CCNC: 90 U/L (ref 39–117)
ALT SERPL-CCNC: 32 U/L (ref 1–33)
AST SERPL-CCNC: 26 U/L (ref 1–32)
BILIRUB SERPL-MCNC: 1 MG/DL (ref 0.1–1.2)
BUN SERPL-MCNC: 11 MG/DL (ref 6–20)
BUN/CREAT SERPL: 12.5 (ref 7–25)
CALCIUM SERPL-MCNC: 9.6 MG/DL (ref 8.6–10.5)
CHLORIDE SERPL-SCNC: 104 MMOL/L (ref 98–107)
CHOLEST SERPL-MCNC: 246 MG/DL (ref 0–200)
CO2 SERPL-SCNC: 29.6 MMOL/L (ref 22–29)
CREAT SERPL-MCNC: 0.88 MG/DL (ref 0.57–1)
GFR SERPLBLD CREATININE-BSD FMLA CKD-EPI: 67 ML/MIN/1.73
GFR SERPLBLD CREATININE-BSD FMLA CKD-EPI: 81 ML/MIN/1.73
GLOBULIN SER CALC-MCNC: 2.7 GM/DL
GLUCOSE SERPL-MCNC: 89 MG/DL (ref 65–99)
HDLC SERPL-MCNC: 67 MG/DL (ref 40–60)
LDLC SERPL CALC-MCNC: 161 MG/DL (ref 0–100)
LDLC/HDLC SERPL: 2.4 {RATIO}
POTASSIUM SERPL-SCNC: 4.1 MMOL/L (ref 3.5–5.2)
PROT SERPL-MCNC: 6.6 G/DL (ref 6–8.5)
SODIUM SERPL-SCNC: 144 MMOL/L (ref 136–145)
TRIGL SERPL-MCNC: 90 MG/DL (ref 0–150)
VLDLC SERPL CALC-MCNC: 18 MG/DL (ref 5–40)

## 2018-06-21 ENCOUNTER — OFFICE VISIT (OUTPATIENT)
Dept: FAMILY MEDICINE CLINIC | Facility: CLINIC | Age: 53
End: 2018-06-21

## 2018-06-21 VITALS
OXYGEN SATURATION: 98 % | DIASTOLIC BLOOD PRESSURE: 70 MMHG | TEMPERATURE: 98.2 F | HEIGHT: 61 IN | SYSTOLIC BLOOD PRESSURE: 104 MMHG | BODY MASS INDEX: 26.13 KG/M2 | WEIGHT: 138.4 LBS | HEART RATE: 84 BPM

## 2018-06-21 DIAGNOSIS — R11.0 NAUSEA: Primary | ICD-10-CM

## 2018-06-21 DIAGNOSIS — K59.00 CONSTIPATION, UNSPECIFIED CONSTIPATION TYPE: ICD-10-CM

## 2018-06-21 PROCEDURE — 99213 OFFICE O/P EST LOW 20 MIN: CPT | Performed by: NURSE PRACTITIONER

## 2018-06-21 RX ORDER — ONDANSETRON 4 MG/1
4 TABLET, ORALLY DISINTEGRATING ORAL EVERY 8 HOURS PRN
Qty: 30 TABLET | Refills: 0 | Status: SHIPPED | OUTPATIENT
Start: 2018-06-21 | End: 2018-07-18

## 2018-06-21 NOTE — PROGRESS NOTES
"Subjective   Zi Jasso is a 52 y.o. female.     History of Present Illness     Pt presents today with c/o queasy felling for 3 weeks, she also had constipation for 3 days but feels bowels are mostly back to normal. She is leaving for [place] in a few days so was concerned about queasy feeling.  No c/o N/V/D or distention.     The following portions of the patient's history were reviewed and updated as appropriate: allergies, current medications, past social history and problem list.    Review of Systems   Gastrointestinal: Positive for nausea.   All other systems reviewed and are negative.      Objective   /70 (BP Location: Right arm, Patient Position: Sitting)   Pulse 84   Temp 98.2 °F (36.8 °C)   Ht 154.3 cm (60.75\")   Wt 62.8 kg (138 lb 6.4 oz)   SpO2 98%   BMI 26.37 kg/m²   Physical Exam   Constitutional: She is oriented to person, place, and time. Vital signs are normal. She appears well-developed and well-nourished. No distress.   HENT:   Head: Normocephalic.   Cardiovascular: Normal rate, regular rhythm and normal heart sounds.    Pulmonary/Chest: Effort normal and breath sounds normal.   Neurological: She is alert and oriented to person, place, and time. Gait normal.   Psychiatric: She has a normal mood and affect. Her behavior is normal. Judgment and thought content normal.   Vitals reviewed.      Assessment/Plan   Problem List Items Addressed This Visit        Digestive    Nausea - Primary    Relevant Medications    ondansetron ODT (ZOFRAN ODT) 4 MG disintegrating tablet    Constipation        Gave samples of Linzess in case still slightly constipated.     RTO PRN     "

## 2018-07-18 ENCOUNTER — OFFICE VISIT (OUTPATIENT)
Dept: FAMILY MEDICINE CLINIC | Facility: CLINIC | Age: 53
End: 2018-07-18

## 2018-07-18 VITALS
SYSTOLIC BLOOD PRESSURE: 108 MMHG | BODY MASS INDEX: 26.62 KG/M2 | OXYGEN SATURATION: 98 % | HEIGHT: 61 IN | DIASTOLIC BLOOD PRESSURE: 60 MMHG | HEART RATE: 68 BPM | TEMPERATURE: 98.6 F | WEIGHT: 141 LBS | RESPIRATION RATE: 14 BRPM

## 2018-07-18 DIAGNOSIS — R10.13 EPIGASTRIC ABDOMINAL PAIN: Primary | ICD-10-CM

## 2018-07-18 DIAGNOSIS — R53.83 FATIGUE, UNSPECIFIED TYPE: ICD-10-CM

## 2018-07-18 DIAGNOSIS — R63.5 WEIGHT GAIN: ICD-10-CM

## 2018-07-18 PROCEDURE — 99214 OFFICE O/P EST MOD 30 MIN: CPT | Performed by: FAMILY MEDICINE

## 2018-07-18 NOTE — PROGRESS NOTES
Zi Jasso is a 53 y.o. female.     Chief Complaint   Patient presents with   • GI Problem     Patient has a lot of cramps and sounds in her abdomen for over a month.        HPI     Patient presents the office today transferring care from the nurse practitioner to my care.  She's here to discuss some issues that are all new to me.  Patient states that for about 3-4 months she's been experiencing some increasing abdominal pain and discomfort.  She describes it as a very active stomach.  She will get pain in the epigastric region.  Accompanied with nausea but that is since resolved.  She's never had any issues with diarrhea or constipation.  No blood in her stools.  No fever or chills.  She states that when she eats she feels like it helps but then the stomach starts up again.  Her dog was recently diagnosed with H. pylori.  She's also been experiencing fatigue and weight gain despite being very active physically and eating healthy diet.    The following portions of the patient's history were reviewed and updated as appropriate: allergies, current medications, past family history, past medical history, past social history, past surgical history and problem list.    Review of Systems   Gastrointestinal: Positive for abdominal pain.   All other systems reviewed and are negative.      Objective  Vitals:    07/18/18 1134   BP: 108/60   Pulse: 68   Resp: 14   Temp: 98.6 °F (37 °C)   SpO2: 98%       Physical Exam   Constitutional: She is oriented to person, place, and time. She appears well-developed and well-nourished. No distress.   HENT:   Head: Normocephalic and atraumatic.   Right Ear: External ear normal.   Left Ear: External ear normal.   Nose: Nose normal.   Mouth/Throat: Oropharynx is clear and moist.   Eyes: Pupils are equal, round, and reactive to light. Conjunctivae and EOM are normal. Right eye exhibits no discharge. Left eye exhibits no discharge. No scleral icterus.   Neck: Normal range of motion. Neck  supple.   Cardiovascular: Normal rate, regular rhythm and normal heart sounds.  Exam reveals no friction rub.    No murmur heard.  Pulmonary/Chest: Effort normal and breath sounds normal. No respiratory distress. She has no wheezes. She has no rales.   Abdominal: Soft. Bowel sounds are normal. She exhibits no distension. There is no tenderness. There is no rebound and no guarding.   Lymphadenopathy:     She has no cervical adenopathy.   Neurological: She is alert and oriented to person, place, and time.   Skin: Skin is warm and dry. She is not diaphoretic.   Nursing note and vitals reviewed.        Current Outpatient Prescriptions:   •  azelastine (ASTELIN) 0.1 % nasal spray, SPRAY ONCE IN EACH NOSTRIL TWICE DAILY, Disp: 90 mL, Rfl: 0  •  Butalbital-APAP-Caffeine -40 MG per capsule, Take 1 capsule by mouth Every 4 (Four) Hours As Needed for headaches., Disp: , Rfl:   •  Coenzyme Q10 (COQ10 PO), Take  by mouth., Disp: , Rfl:   •  levocetirizine (XYZAL) 5 MG tablet, Take 1 tablet by mouth Daily., Disp: 90 tablet, Rfl: 3  •  Red Yeast Rice Extract (RED YEAST RICE PO), Take  by mouth., Disp: , Rfl:   •  SYNTHROID 75 MCG tablet, TAKE 1 TABLET BY MOUTH DAILY, Disp: 90 tablet, Rfl: 0    Procedures    Lab Results (most recent)     None              Zi was seen today for gi problem.    Diagnoses and all orders for this visit:    Epigastric abdominal pain  -     CBC Auto Differential  -     H.pylori,IgG / IgA Antibodies  -     H. Pylori Breath Test - Breath, Lung  -     Thyroid Panel With TSH  -     Vitamin D 25 Hydroxy  -     CT abdomen wo contrast; Future    Weight gain  -     CBC Auto Differential  -     H.pylori,IgG / IgA Antibodies  -     H. Pylori Breath Test - Breath, Lung  -     Thyroid Panel With TSH  -     Vitamin D 25 Hydroxy  -     CT abdomen wo contrast; Future    Fatigue, unspecified type  -     CBC Auto Differential  -     H.pylori,IgG / IgA Antibodies  -     H. Pylori Breath Test - Breath, Lung  -      Thyroid Panel With TSH  -     Vitamin D 25 Hydroxy  -     CT abdomen wo contrast; Future      Extensive conversation had with the patient regarding her symptoms.  We'll get labs as above.  We'll also get a CT of the abdomen with oral contrast.  I'm concerned for a gastric ulcer.  Advised on over-the-counter therapies that might help her symptoms.  We'll consider referral to gastroenterology for possible endoscopy.    Return in about 2 weeks (around 8/1/2018) for Recheck.      Felipe Cedeno MD

## 2018-07-19 LAB
25(OH)D3+25(OH)D2 SERPL-MCNC: 32 NG/ML (ref 30–100)
BASOPHILS # BLD AUTO: 0.03 10*3/MM3 (ref 0–0.2)
BASOPHILS NFR BLD AUTO: 0.6 % (ref 0–1.5)
EOSINOPHIL # BLD AUTO: 0.33 10*3/MM3 (ref 0–0.7)
EOSINOPHIL NFR BLD AUTO: 6.3 % (ref 0.3–6.2)
ERYTHROCYTE [DISTWIDTH] IN BLOOD BY AUTOMATED COUNT: 12.9 % (ref 11.7–13)
FT4I SERPL CALC-MCNC: 2.4 (ref 1.2–4.9)
H PYLORI IGA SER-ACNC: <9 UNITS (ref 0–8.9)
H PYLORI IGG SER IA-ACNC: 0.21 INDEX VALUE (ref 0–0.79)
HCT VFR BLD AUTO: 42.6 % (ref 35.6–45.5)
HGB BLD-MCNC: 13.4 G/DL (ref 11.9–15.5)
IMM GRANULOCYTES # BLD: 0.02 10*3/MM3 (ref 0–0.03)
IMM GRANULOCYTES NFR BLD: 0.4 % (ref 0–0.5)
LYMPHOCYTES # BLD AUTO: 1.57 10*3/MM3 (ref 0.9–4.8)
LYMPHOCYTES NFR BLD AUTO: 29.7 % (ref 19.6–45.3)
MCH RBC QN AUTO: 29.2 PG (ref 26.9–32)
MCHC RBC AUTO-ENTMCNC: 31.5 G/DL (ref 32.4–36.3)
MCV RBC AUTO: 92.8 FL (ref 80.5–98.2)
MONOCYTES # BLD AUTO: 0.6 10*3/MM3 (ref 0.2–1.2)
MONOCYTES NFR BLD AUTO: 11.4 % (ref 5–12)
NEUTROPHILS # BLD AUTO: 2.73 10*3/MM3 (ref 1.9–8.1)
NEUTROPHILS NFR BLD AUTO: 51.6 % (ref 42.7–76)
PLATELET # BLD AUTO: 225 10*3/MM3 (ref 140–500)
RBC # BLD AUTO: 4.59 10*6/MM3 (ref 3.9–5.2)
T3RU NFR SERPL: 28 % (ref 24–39)
T4 SERPL-MCNC: 8.4 UG/DL (ref 4.5–12)
TSH SERPL DL<=0.005 MIU/L-ACNC: 0.37 UIU/ML (ref 0.45–4.5)
WBC # BLD AUTO: 5.28 10*3/MM3 (ref 4.5–10.7)

## 2018-07-25 ENCOUNTER — TELEPHONE (OUTPATIENT)
Dept: FAMILY MEDICINE CLINIC | Facility: CLINIC | Age: 53
End: 2018-07-25

## 2018-07-27 ENCOUNTER — HOSPITAL ENCOUNTER (OUTPATIENT)
Dept: CT IMAGING | Facility: HOSPITAL | Age: 53
Discharge: HOME | End: 2018-07-27
Admitting: FAMILY MEDICINE

## 2018-07-27 DIAGNOSIS — R10.13 EPIGASTRIC ABDOMINAL PAIN: ICD-10-CM

## 2018-07-27 DIAGNOSIS — R53.83 FATIGUE, UNSPECIFIED TYPE: ICD-10-CM

## 2018-07-27 DIAGNOSIS — R63.5 WEIGHT GAIN: ICD-10-CM

## 2018-07-27 PROCEDURE — 0 DIATRIZOATE MEGLUMINE & SODIUM PER 1 ML: Performed by: FAMILY MEDICINE

## 2018-07-27 PROCEDURE — 74150 CT ABDOMEN W/O CONTRAST: CPT

## 2018-07-27 RX ADMIN — DIATRIZOATE MEGLUMINE AND DIATRIZOATE SODIUM 30 ML: 660; 100 LIQUID ORAL; RECTAL at 08:42

## 2018-08-03 ENCOUNTER — OFFICE VISIT (OUTPATIENT)
Dept: FAMILY MEDICINE CLINIC | Facility: CLINIC | Age: 53
End: 2018-08-03

## 2018-08-03 VITALS
BODY MASS INDEX: 26.38 KG/M2 | WEIGHT: 139.7 LBS | HEIGHT: 61 IN | TEMPERATURE: 98.2 F | HEART RATE: 74 BPM | OXYGEN SATURATION: 98 % | SYSTOLIC BLOOD PRESSURE: 122 MMHG | RESPIRATION RATE: 12 BRPM | DIASTOLIC BLOOD PRESSURE: 60 MMHG

## 2018-08-03 DIAGNOSIS — E03.9 ADULT HYPOTHYROIDISM: ICD-10-CM

## 2018-08-03 DIAGNOSIS — R10.13 EPIGASTRIC ABDOMINAL PAIN: ICD-10-CM

## 2018-08-03 DIAGNOSIS — Z78.0 MENOPAUSE: ICD-10-CM

## 2018-08-03 DIAGNOSIS — K21.9 GASTROESOPHAGEAL REFLUX DISEASE WITHOUT ESOPHAGITIS: Primary | ICD-10-CM

## 2018-08-03 PROCEDURE — 99214 OFFICE O/P EST MOD 30 MIN: CPT | Performed by: FAMILY MEDICINE

## 2018-08-03 NOTE — PROGRESS NOTES
Zi Jasso is a 53 y.o. female.     Chief Complaint   Patient presents with   • Abdominal Pain     Patient is following up on CT scan and blood work results.        HPI     Patient percent cells today follow-up on a number of issues.  Her last office visit patient was experiencing some abdominal pain.  We got a CT scan with oral contrast which came back negative.  Without blood work which revealed a slightly decreased TSH level.  She is currently taking 75 µg of Synthroid.  Patient also feels like she's having menopausal type symptoms and issues.  She is interested in possibly doing some hormone replacement.  She does not currently have a gynecologist.  She states that she has frequent hot flashes and difficult time sleeping.  She's also had decreased mood and labile emotional status.  Her stomach issues have not improved.  She did stop taking the red yeast Rice was has improved slightly.  She continues to have high cholesterol.    The following portions of the patient's history were reviewed and updated as appropriate: allergies, current medications, past family history, past medical history, past social history, past surgical history and problem list.    Review of Systems   Gastrointestinal: Positive for abdominal pain.   All other systems reviewed and are negative.      Objective  Vitals:    08/03/18 1319   BP: 122/60   Pulse: 74   Resp: 12   Temp: 98.2 °F (36.8 °C)   SpO2: 98%       Physical Exam   Constitutional: She is oriented to person, place, and time. She appears well-developed and well-nourished. No distress.   Neurological: She is alert and oriented to person, place, and time.   Skin: She is not diaphoretic.   Psychiatric: She has a normal mood and affect. Her behavior is normal.   Nursing note and vitals reviewed.        Current Outpatient Prescriptions:   •  azelastine (ASTELIN) 0.1 % nasal spray, SPRAY ONCE IN EACH NOSTRIL TWICE DAILY, Disp: 90 mL, Rfl: 0  •  Butalbital-APAP-Caffeine -40 MG  per capsule, Take 1 capsule by mouth Every 4 (Four) Hours As Needed for headaches., Disp: , Rfl:   •  Coenzyme Q10 (COQ10 PO), Take  by mouth., Disp: , Rfl:   •  levocetirizine (XYZAL) 5 MG tablet, Take 1 tablet by mouth Daily., Disp: 90 tablet, Rfl: 3  •  Red Yeast Rice Extract (RED YEAST RICE PO), Take  by mouth., Disp: , Rfl:   •  SYNTHROID 75 MCG tablet, TAKE 1 TABLET BY MOUTH DAILY, Disp: 90 tablet, Rfl: 0    Procedures    Lab Results (most recent)     None              Zi was seen today for abdominal pain.    Diagnoses and all orders for this visit:    Gastroesophageal reflux disease without esophagitis    Epigastric abdominal pain  -     Ambulatory Referral to Gastroenterology    Menopause  -     Ambulatory Referral to Gynecology    Adult hypothyroidism  -     Ambulatory Referral to Endocrinology      We reviewed her blood work as well as her CT scan.  At this point I do think a referral to gastroenterology as appropriate for possible scope.  H. pylori was negative.  We'll refer to gynecology for consultation regarding hormone replacement postmenopausal phase.  Will also refer to endocrinology for consultation regarding her continued issues with thyroid.    Return in about 3 months (around 11/3/2018) for Recheck.      Felipe Cedeno MD

## 2018-08-15 ENCOUNTER — OFFICE VISIT (OUTPATIENT)
Dept: ENDOCRINOLOGY | Age: 53
End: 2018-08-15

## 2018-08-15 VITALS
SYSTOLIC BLOOD PRESSURE: 112 MMHG | OXYGEN SATURATION: 97 % | HEART RATE: 85 BPM | BODY MASS INDEX: 27.09 KG/M2 | DIASTOLIC BLOOD PRESSURE: 78 MMHG | WEIGHT: 138 LBS | HEIGHT: 60 IN

## 2018-08-15 DIAGNOSIS — E06.3 HASHIMOTO'S DISEASE: ICD-10-CM

## 2018-08-15 DIAGNOSIS — E78.5 HYPERLIPIDEMIA, UNSPECIFIED HYPERLIPIDEMIA TYPE: ICD-10-CM

## 2018-08-15 DIAGNOSIS — R53.82 CHRONIC FATIGUE: ICD-10-CM

## 2018-08-15 DIAGNOSIS — E03.9 ADULT HYPOTHYROIDISM: Primary | ICD-10-CM

## 2018-08-15 DIAGNOSIS — E05.80 IATROGENIC HYPERTHYROIDISM: ICD-10-CM

## 2018-08-15 PROCEDURE — 99205 OFFICE O/P NEW HI 60 MIN: CPT | Performed by: INTERNAL MEDICINE

## 2018-08-15 RX ORDER — LEVOTHYROXINE SODIUM 125 MCG
62.5 TABLET ORAL DAILY
Qty: 45 TABLET | Refills: 2 | Status: SHIPPED | OUTPATIENT
Start: 2018-08-15 | End: 2018-10-16 | Stop reason: SDUPTHER

## 2018-08-15 RX ORDER — OMEPRAZOLE 20 MG/1
20 CAPSULE, DELAYED RELEASE ORAL DAILY
COMMUNITY
End: 2018-10-22

## 2018-08-15 NOTE — PROGRESS NOTES
53 y.o.  Patient Care Team:  Felipe Reilly MD as PCP - General (Family Medicine)    Chief complaint   NEW PATIENT CONSULT REFERRED BY DR REILLY FOR HYPOTHYROIDISM  HPI   Patient is a 53-year-old white female with a past history of hypothyroidism for more than 20 years came for a new patient consultation     reports that she was diagnosed with hypothyroidism when she was living in [place] in 1999 and has been on medication ever since  She has been on 75 µg Synthroid for a very long time  She was not able to tolerate the levothyroxine generic hence she was on brand name Synthroid    Patient takes the Synthroid early morning on an empty stomach  She does not eat or drink for at least 2 hours if not longer  She is taking all her medication later in the day  Chronic fatigue  Patient has had fatigue for a very long time but in the past few months her fatigue is getting worse  She used to stay up late until 1:00 or 2:00 AM but now she feels extremely sleepy and tired by 9 PM and goes to bed  Insomnia chronic  Patient reports that despite chronic insomnia her sleep pattern has changed over the past few months and she is going to bed early and waking up late  She has no energy after long night sleep  Patient is also been reporting increasing sweating, shaking, occasional chest pains and chest pressure, stomach rumbling constantly and increasing hunger at times and significant hair loss.  She denied any specific instances of palpitations and she has been drinking more caffeine to stay up alert during the day    Patient is unaware of Hashimoto's thyroiditis but has a strong family history of thyroid problems on both sides of the family  She is also been expressing chronic headaches    Hyperlipidemia  Patient used to be on statins including Crestor.  Apparently she had significant problems with the statin medication.  She started utilizing red yeast Rice extract over the past couple years  She reports that her lipid  profile is improved significantly.  She recently started experiencing lower abdominal symptoms stomach rumbling and achiness and discontinued this extract a few months ago and feels only slightly better now    Interval History      The following portions of the patient's history were reviewed and updated as appropriate: allergies, current medications, past family history, past medical history, past social history, past surgical history and problem list.    Past Medical History:   Diagnosis Date   • Allergic    • Chronic headaches    • Fibrocystic breast    • Hyperlipidemia    • Hypothyroidism        Family History   Problem Relation Age of Onset   • Heart attack Father    • Cancer Maternal Aunt         breast and pancreatic cancer    • Breast cancer Maternal Aunt    • Cancer Maternal Grandmother         pancreatic cancer     • Cancer Paternal Grandmother         brain tumor        Social History     Social History   • Marital status:      Spouse name: N/A   • Number of children: N/A   • Years of education: N/A     Occupational History   • Not on file.     Social History Main Topics   • Smoking status: Never Smoker   • Smokeless tobacco: Never Used   • Alcohol use Yes      Comment: Rare   • Drug use: No   • Sexual activity: Defer     Other Topics Concern   • Not on file     Social History Narrative   • No narrative on file       Allergies   Allergen Reactions   • Penicillins Rash         Current Outpatient Prescriptions:   •  azelastine (ASTELIN) 0.1 % nasal spray, SPRAY ONCE IN EACH NOSTRIL TWICE DAILY, Disp: 90 mL, Rfl: 0  •  Butalbital-APAP-Caffeine -40 MG per capsule, Take 1 capsule by mouth Every 4 (Four) Hours As Needed for headaches., Disp: , Rfl:   •  Coenzyme Q10 (COQ10 PO), Take  by mouth., Disp: , Rfl:   •  levocetirizine (XYZAL) 5 MG tablet, Take 1 tablet by mouth Daily., Disp: 90 tablet, Rfl: 3  •  Red Yeast Rice Extract (RED YEAST RICE PO), Take  by mouth., Disp: , Rfl:   •  SYNTHROID 75  "MCG tablet, TAKE 1 TABLET BY MOUTH DAILY, Disp: 90 tablet, Rfl: 0           Review of Systems   Constitutional: Positive for fatigue and unexpected weight change. Negative for appetite change.   HENT: Negative for trouble swallowing.    Respiratory: Negative for shortness of breath.    Cardiovascular: Positive for chest pain. Negative for palpitations and leg swelling.   Gastrointestinal: Positive for abdominal pain. Negative for constipation, diarrhea, nausea and vomiting.   Endocrine: Negative for cold intolerance and heat intolerance.   All other systems reviewed and are negative.        Objective:  /78   Pulse 85   Ht 152.4 cm (60\")   Wt 62.6 kg (138 lb)   SpO2 97%   BMI 26.95 kg/m²     Physical Exam   Constitutional: She is oriented to person, place, and time. She appears well-developed and well-nourished.   HENT:   Head: Normocephalic and atraumatic.   Eyes: Pupils are equal, round, and reactive to light. EOM are normal.   Neck: Normal range of motion. Neck supple. No thyromegaly present.   Cardiovascular: Normal rate, regular rhythm, normal heart sounds and intact distal pulses.    Pulmonary/Chest: Effort normal and breath sounds normal.   Abdominal: Soft. Bowel sounds are normal. She exhibits no distension.   Musculoskeletal: Normal range of motion. She exhibits no edema.   Neurological: She is alert and oriented to person, place, and time. She displays normal reflexes.   Skin: Skin is warm and dry.   Psychiatric: She has a normal mood and affect. Her behavior is normal.   Nursing note and vitals reviewed.        Results Review:    I reviewed the patient's new clinical results.  Office Visit on 07/18/2018   Component Date Value Ref Range Status   • H. pylori, IgA ABS 07/18/2018 <9.0  0.0 - 8.9 units Final    Comment:                                 Negative          <9.0                                  Equivocal   9.0 - 11.0                                  Positive         >11.0     • H. pylori " IgG 07/18/2018 0.21  0.00 - 0.79 Index Value Final    Comment:                              Negative           <0.80                               Equivocal    0.80 - 0.89                               Positive           >0.89     • TSH 07/18/2018 0.366* 0.450 - 4.500 uIU/mL Final   • T4, Total 07/18/2018 8.4  4.5 - 12.0 ug/dL Final   • T3 Uptake 07/18/2018 28  24 - 39 % Final   • Free Thyroxine Index 07/18/2018 2.4  1.2 - 4.9 Final   • 25 Hydroxy, Vitamin D 07/18/2018 32.0  30.0 - 100.0 ng/ml Final    Comment: Reference Range for Total Vitamin D 25(OH)  Deficiency    <20.0 ng/mL  Insufficiency 21-29 ng/mL  Sufficiency    ng/mL  Toxicity      >100 ng/ml        • WBC 07/18/2018 5.28  4.50 - 10.70 10*3/mm3 Final   • RBC 07/18/2018 4.59  3.90 - 5.20 10*6/mm3 Final   • Hemoglobin 07/18/2018 13.4  11.9 - 15.5 g/dL Final   • Hematocrit 07/18/2018 42.6  35.6 - 45.5 % Final   • MCV 07/18/2018 92.8  80.5 - 98.2 fL Final   • MCH 07/18/2018 29.2  26.9 - 32.0 pg Final   • MCHC 07/18/2018 31.5* 32.4 - 36.3 g/dL Final   • RDW 07/18/2018 12.9  11.7 - 13.0 % Final   • Platelets 07/18/2018 225  140 - 500 10*3/mm3 Final   • Neutrophil Rel % 07/18/2018 51.6  42.7 - 76.0 % Final   • Lymphocyte Rel % 07/18/2018 29.7  19.6 - 45.3 % Final   • Monocyte Rel % 07/18/2018 11.4  5.0 - 12.0 % Final   • Eosinophil Rel % 07/18/2018 6.3* 0.3 - 6.2 % Final   • Basophil Rel % 07/18/2018 0.6  0.0 - 1.5 % Final   • Neutrophils Absolute 07/18/2018 2.73  1.90 - 8.10 10*3/mm3 Final   • Lymphocytes Absolute 07/18/2018 1.57  0.90 - 4.80 10*3/mm3 Final   • Monocytes Absolute 07/18/2018 0.60  0.20 - 1.20 10*3/mm3 Final   • Eosinophils Absolute 07/18/2018 0.33  0.00 - 0.70 10*3/mm3 Final   • Basophils Absolute 07/18/2018 0.03  0.00 - 0.20 10*3/mm3 Final   • Immature Granulocyte Rel % 07/18/2018 0.4  0.0 - 0.5 % Final   • Immature Grans Absolute 07/18/2018 0.02  0.00 - 0.03 10*3/mm3 Final       No images are attached to the encounter.    Zi was seen  today for hypothyroidism.    Diagnoses and all orders for this visit:    Adult hypothyroidism  -     T4, Free; Future  -     TSH; Future  -     Thyroid Peroxidase Antibody; Future    Hyperlipidemia, unspecified hyperlipidemia type  -     T4, Free; Future  -     TSH; Future  -     Thyroid Peroxidase Antibody; Future    Hashimoto's disease    Chronic fatigue    Iatrogenic hyperthyroidism    Other orders  -     SYNTHROID 125 MCG tablet; Take 0.5 tablets by mouth Daily.        I discussed regarding the various symptom complex that the patient seemed to have  Thyroid labs from the Epic reviewed  Patient's recent TSH of 0.3 is in line with the previous values of 0.4 and 0.2 over the past one year  Patient is obviously subclinically hyperthyroid most likely iatrogenic    Patient still has thyroid gland and possibly has thyroiditis which complicates the picture since the thyroid gland may be intermittently working and is adding to the iatrogenic hyperthyroid symptoms  Chronic fatigue worsening over the past few months increasing sweating and tremulousness and occasional chest pains and stomach rumbling in hair loss all suggestive of subclinical hyperthyroid state    Patient is contemplating acupuncture for the thyroid health    I discussed the pituitary -thyroid hormone axis and explained to the patient that the low TSH means hyperthyroidism and most of her symptoms are in line with this clinical diagnosis    Patient is currently taking Synthroid 75 µg daily.  I advised her to try 62.5 µg daily on empty stomach  She will take half a tablet of 125  Prescription  sent to pharmacy  I advised the patient to check labs in 2 months  I also discussed the TSH goal of 1-3 as optimal  Patient has not had a thyroid ultrasound in the past    Patient will return to follow-up in 2 months with lab tests.    The total time spent  was more than 60 min of which greater than 35 min ( greater than 50% of the total time ) was spent face to face  on counseling the patient on recommended evaluation and treatment options, instructions for management/treatment and /or follow up  and importance of compliance with chosen management or treatment options

## 2018-08-20 ENCOUNTER — RESULTS ENCOUNTER (OUTPATIENT)
Dept: ENDOCRINOLOGY | Age: 53
End: 2018-08-20

## 2018-08-20 DIAGNOSIS — E78.5 HYPERLIPIDEMIA, UNSPECIFIED HYPERLIPIDEMIA TYPE: ICD-10-CM

## 2018-08-20 DIAGNOSIS — E03.9 ADULT HYPOTHYROIDISM: ICD-10-CM

## 2018-09-04 ENCOUNTER — OFFICE VISIT (OUTPATIENT)
Dept: GASTROENTEROLOGY | Facility: CLINIC | Age: 53
End: 2018-09-04

## 2018-09-04 VITALS
BODY MASS INDEX: 27.29 KG/M2 | DIASTOLIC BLOOD PRESSURE: 77 MMHG | SYSTOLIC BLOOD PRESSURE: 106 MMHG | HEIGHT: 60 IN | HEART RATE: 75 BPM | WEIGHT: 139 LBS

## 2018-09-04 DIAGNOSIS — R12 HEARTBURN: Primary | ICD-10-CM

## 2018-09-04 DIAGNOSIS — R13.19 ESOPHAGEAL DYSPHAGIA: ICD-10-CM

## 2018-09-04 DIAGNOSIS — R07.89 OTHER CHEST PAIN: ICD-10-CM

## 2018-09-04 PROCEDURE — 99203 OFFICE O/P NEW LOW 30 MIN: CPT | Performed by: INTERNAL MEDICINE

## 2018-09-04 RX ORDER — SODIUM CHLORIDE, SODIUM LACTATE, POTASSIUM CHLORIDE, CALCIUM CHLORIDE 600; 310; 30; 20 MG/100ML; MG/100ML; MG/100ML; MG/100ML
30 INJECTION, SOLUTION INTRAVENOUS CONTINUOUS
Status: CANCELLED | OUTPATIENT
Start: 2018-10-12

## 2018-09-04 NOTE — PROGRESS NOTES
Subjective   Zi Jasso is a 53 y.o.. female is being seen for consultation today at the request of Felipe Cedeno MD    Chief Complaint   Patient presents with   • Heartburn   • Difficulty Swallowing     History of Present Illness  Patient says that she has a long history of pyrosis and intermittent dysphagia, going back several years.  However, is been getting worse over the past 3 months and because of this she sought medical attention for she tends to perceive a hang up of food at the level of the sternal notch.  She says the symptoms are mild to moderate.  She has had no hematemesis melena or hematochezia.  She describes herself as a picky eater, has been tested for food allergies in the past and says that she had multiple positive reactions.    The following portions of the patient's history were reviewed and updated as appropriate: allergies, current medications, past family history, past medical history, past social history, past surgical history and problem list.      Current Outpatient Prescriptions:   •  azelastine (ASTELIN) 0.1 % nasal spray, SPRAY ONCE IN EACH NOSTRIL TWICE DAILY, Disp: 90 mL, Rfl: 0  •  Butalbital-APAP-Caffeine -40 MG per capsule, Take 1 capsule by mouth Every 4 (Four) Hours As Needed for headaches., Disp: , Rfl:   •  levocetirizine (XYZAL) 5 MG tablet, Take 1 tablet by mouth Daily., Disp: 90 tablet, Rfl: 3  •  omeprazole (priLOSEC) 20 MG capsule, Take 20 mg by mouth Daily., Disp: , Rfl:   •  SYNTHROID 125 MCG tablet, Take 0.5 tablets by mouth Daily., Disp: 45 tablet, Rfl: 2  •  Coenzyme Q10 (COQ10 PO), Take 200 mg by mouth., Disp: , Rfl:   •  Red Yeast Rice Extract (RED YEAST RICE PO), Take 600 mg by mouth., Disp: , Rfl:     Family History   Problem Relation Age of Onset   • Heart attack Father    • Cancer Maternal Aunt         breast and pancreatic cancer    • Breast cancer Maternal Aunt    • Pancreatic cancer Maternal Aunt    • Cancer Maternal Grandmother         pancreatic  cancer     • Kidney failure Maternal Grandmother    • Cancer Paternal Grandmother         brain tumor        Review of Systems   Constitutional: Negative for appetite change, diaphoresis, fatigue, fever and unexpected weight change.   HENT: Positive for trouble swallowing. Negative for hearing loss, mouth sores and sore throat.    Eyes: Negative for pain and redness.   Respiratory: Negative for choking and shortness of breath.    Cardiovascular: Positive for chest pain. Negative for leg swelling.   Gastrointestinal: Negative for abdominal distention, abdominal pain, anal bleeding, blood in stool, constipation, diarrhea, nausea, rectal pain and vomiting.   Genitourinary: Negative for flank pain and hematuria.   Musculoskeletal: Negative for arthralgias and joint swelling.   Skin: Negative for color change and rash.   Allergic/Immunologic: Negative for food allergies and immunocompromised state.   Neurological: Negative for dizziness, seizures and headaches.   Hematological: Does not bruise/bleed easily.   Psychiatric/Behavioral: Negative for confusion, sleep disturbance and suicidal ideas. The patient is not nervous/anxious.        Objective   Physical Exam   Constitutional: She is oriented to person, place, and time. She appears well-developed and well-nourished.   HENT:   Head: Normocephalic and atraumatic.   Right Ear: External ear normal.   Left Ear: External ear normal.   Nose: Nose normal.   Eyes: Pupils are equal, round, and reactive to light. Conjunctivae are normal.   Neck: Neck supple. No thyromegaly present.   Cardiovascular: Normal heart sounds.  Exam reveals no gallop and no friction rub.    No murmur heard.  Pulmonary/Chest: Effort normal and breath sounds normal.   Abdominal: Soft. Bowel sounds are normal. She exhibits no distension and no mass. There is no tenderness.   Musculoskeletal: She exhibits no edema.   Neurological: She is alert and oriented to person, place, and time.   Skin: No rash noted.  No erythema.   Psychiatric: She has a normal mood and affect. Her behavior is normal.   Nursing note and vitals reviewed.      Pertinent laboratory results were reviewed.  and Pertinent old records were reviewed.     Assessment/Plan   Problems Addressed this Visit        Digestive    Esophageal dysphagia    Relevant Orders    Case Request (Completed)    Heartburn - Primary    Relevant Orders    Case Request (Completed)       Nervous and Auditory    Chest pain    Relevant Orders    Case Request (Completed)        I wonder if she could have allergic esophagitis.  I would like to sort this out for endoscopy and it was scheduled accordingly.

## 2018-09-05 ENCOUNTER — TELEPHONE (OUTPATIENT)
Dept: GASTROENTEROLOGY | Facility: CLINIC | Age: 53
End: 2018-09-05

## 2018-09-05 PROBLEM — R07.89 OTHER CHEST PAIN: Status: ACTIVE | Noted: 2018-09-05

## 2018-09-05 NOTE — TELEPHONE ENCOUNTER
----- Message from Cynthia Wang RN sent at 9/4/2018  3:37 PM EDT -----  Regarding: Schedule Procedure  Contact: [phone]  Pt seen in office today, 9/4/18, and is ready to schedule procedure for 10/5/18 @ 8:30 am  Called patient let her know procedure is scheduled for 10-5-18. She will arrive at 9 am.

## 2018-10-03 ENCOUNTER — TELEPHONE (OUTPATIENT)
Dept: FAMILY MEDICINE CLINIC | Facility: CLINIC | Age: 53
End: 2018-10-03

## 2018-10-03 ENCOUNTER — RESULTS ENCOUNTER (OUTPATIENT)
Dept: ENDOCRINOLOGY | Age: 53
End: 2018-10-03

## 2018-10-03 DIAGNOSIS — E03.9 ADULT HYPOTHYROIDISM: ICD-10-CM

## 2018-10-03 DIAGNOSIS — E78.5 HYPERLIPIDEMIA, UNSPECIFIED HYPERLIPIDEMIA TYPE: ICD-10-CM

## 2018-10-05 ENCOUNTER — TELEPHONE (OUTPATIENT)
Dept: GASTROENTEROLOGY | Facility: CLINIC | Age: 53
End: 2018-10-05

## 2018-10-05 ENCOUNTER — TELEPHONE (OUTPATIENT)
Dept: ENDOCRINOLOGY | Age: 53
End: 2018-10-05

## 2018-10-05 NOTE — TELEPHONE ENCOUNTER
Patient insurance [facility] called to get codes to make sure that the labs are covered spoke with them and got the procedure codes and icd code to cover labs

## 2018-10-10 LAB
T4 FREE SERPL-MCNC: 1.27 NG/DL (ref 0.93–1.7)
THYROPEROXIDASE AB SERPL-ACNC: 135 IU/ML (ref 0–34)
TSH SERPL DL<=0.005 MIU/L-ACNC: 2.5 MIU/ML (ref 0.27–4.2)

## 2018-10-12 ENCOUNTER — HOSPITAL ENCOUNTER (OUTPATIENT)
Facility: HOSPITAL | Age: 53
Setting detail: HOSPITAL OUTPATIENT SURGERY
Discharge: HOME | End: 2018-10-12
Attending: INTERNAL MEDICINE | Admitting: INTERNAL MEDICINE

## 2018-10-12 ENCOUNTER — ANESTHESIA EVENT (OUTPATIENT)
Dept: GASTROENTEROLOGY | Facility: HOSPITAL | Age: 53
End: 2018-10-12

## 2018-10-12 ENCOUNTER — ANESTHESIA (OUTPATIENT)
Dept: GASTROENTEROLOGY | Facility: HOSPITAL | Age: 53
End: 2018-10-12

## 2018-10-12 VITALS
DIASTOLIC BLOOD PRESSURE: 75 MMHG | HEIGHT: 65 IN | BODY MASS INDEX: 22.86 KG/M2 | OXYGEN SATURATION: 99 % | RESPIRATION RATE: 16 BRPM | TEMPERATURE: 98.5 F | SYSTOLIC BLOOD PRESSURE: 104 MMHG | HEART RATE: 62 BPM | WEIGHT: 137.19 LBS

## 2018-10-12 DIAGNOSIS — R13.19 ESOPHAGEAL DYSPHAGIA: ICD-10-CM

## 2018-10-12 DIAGNOSIS — R07.89 OTHER CHEST PAIN: ICD-10-CM

## 2018-10-12 DIAGNOSIS — R12 HEARTBURN: ICD-10-CM

## 2018-10-12 PROCEDURE — 25010000002 PROPOFOL 10 MG/ML EMULSION: Performed by: ANESTHESIOLOGY

## 2018-10-12 PROCEDURE — 43239 EGD BIOPSY SINGLE/MULTIPLE: CPT | Performed by: INTERNAL MEDICINE

## 2018-10-12 PROCEDURE — 25010000002 PROPOFOL 1000 MG/ML EMULSION: Performed by: ANESTHESIOLOGY

## 2018-10-12 PROCEDURE — 88305 TISSUE EXAM BY PATHOLOGIST: CPT | Performed by: INTERNAL MEDICINE

## 2018-10-12 PROCEDURE — 88312 SPECIAL STAINS GROUP 1: CPT | Performed by: INTERNAL MEDICINE

## 2018-10-12 RX ORDER — SODIUM CHLORIDE, SODIUM LACTATE, POTASSIUM CHLORIDE, CALCIUM CHLORIDE 600; 310; 30; 20 MG/100ML; MG/100ML; MG/100ML; MG/100ML
30 INJECTION, SOLUTION INTRAVENOUS CONTINUOUS
Status: DISCONTINUED | OUTPATIENT
Start: 2018-10-12 | End: 2018-10-12 | Stop reason: HOSPADM

## 2018-10-12 RX ORDER — LIDOCAINE HYDROCHLORIDE 20 MG/ML
INJECTION, SOLUTION INFILTRATION; PERINEURAL AS NEEDED
Status: DISCONTINUED | OUTPATIENT
Start: 2018-10-12 | End: 2018-10-12 | Stop reason: SURG

## 2018-10-12 RX ORDER — PROPOFOL 10 MG/ML
VIAL (ML) INTRAVENOUS AS NEEDED
Status: DISCONTINUED | OUTPATIENT
Start: 2018-10-12 | End: 2018-10-12 | Stop reason: SURG

## 2018-10-12 RX ORDER — SODIUM CHLORIDE, SODIUM LACTATE, POTASSIUM CHLORIDE, CALCIUM CHLORIDE 600; 310; 30; 20 MG/100ML; MG/100ML; MG/100ML; MG/100ML
1000 INJECTION, SOLUTION INTRAVENOUS CONTINUOUS
Status: DISCONTINUED | OUTPATIENT
Start: 2018-10-12 | End: 2018-10-12 | Stop reason: HOSPADM

## 2018-10-12 RX ORDER — SODIUM CHLORIDE 0.9 % (FLUSH) 0.9 %
3 SYRINGE (ML) INJECTION AS NEEDED
Status: DISCONTINUED | OUTPATIENT
Start: 2018-10-12 | End: 2018-10-12 | Stop reason: HOSPADM

## 2018-10-12 RX ORDER — LIDOCAINE HYDROCHLORIDE 10 MG/ML
0.5 INJECTION, SOLUTION INFILTRATION; PERINEURAL ONCE AS NEEDED
Status: DISCONTINUED | OUTPATIENT
Start: 2018-10-12 | End: 2018-10-12 | Stop reason: HOSPADM

## 2018-10-12 RX ADMIN — LIDOCAINE HYDROCHLORIDE 60 MG: 20 INJECTION, SOLUTION INFILTRATION; PERINEURAL at 10:36

## 2018-10-12 RX ADMIN — PROPOFOL 160 MCG/KG/MIN: 10 INJECTION, EMULSION INTRAVENOUS at 10:36

## 2018-10-12 RX ADMIN — PROPOFOL 150 MG: 10 INJECTION, EMULSION INTRAVENOUS at 10:36

## 2018-10-12 RX ADMIN — SODIUM CHLORIDE, POTASSIUM CHLORIDE, SODIUM LACTATE AND CALCIUM CHLORIDE 30 ML/HR: 600; 310; 30; 20 INJECTION, SOLUTION INTRAVENOUS at 09:56

## 2018-10-12 NOTE — DISCHARGE INSTRUCTIONS
For today:      NO DRIVING, NO OPERATING MACHINERY, NO MAKING LEGAL DECISIONS OR SIGNING IMPORTANT PAPERS    NO ALCOHOL TODAY, BUT DO DRINK EXTRA FLUIDS     YOU MAY EAT AS SOON AS YOU FEEL UP TO IT.  WE SUGGEST  NOTHING SPICY OR GREASY FOR THE FIRST MEAL.    REST FREQUENTLY TODAY    IF YOU HAVE NOT HEARD FROM YOUR DOCTOR WITH ANY LAB RESULTS WITHIN 10 DAYS, CALL YOUR DOCTOR TO DISCUSS YOUR RESULTS

## 2018-10-12 NOTE — H&P
CC: Here for endoscopic evaluation.     HPI: Patient says that she has a long history of pyrosis and intermittent dysphagia, going back several years.  However, is been getting worse over the past 3 months and because of this she sought medical attention for she tends to perceive a hang up of food at the level of the sternal notch.  She says the symptoms are mild to moderate.  She has had no hematemesis melena or hematochezia.  She describes herself as a picky eater, has been tested for food allergies in the past and says that she had multiple positive reactions.    Past Medical History:   Diagnosis Date   • Allergic    • Chronic headaches    • Fibrocystic breast    • Hyperlipidemia    • Hypothyroidism        Past Surgical History:   Procedure Laterality Date   • COLONOSCOPY  09/15/2017    William Cheadle, MD   • EYE SURGERY         Prior to Admission medications    Medication Sig Start Date End Date Taking? Authorizing Provider   azelastine (ASTELIN) 0.1 % nasal spray SPRAY ONCE IN EACH NOSTRIL TWICE DAILY 3/28/18  Yes Leonel Delacruz APRN   Butalbital-APAP-Caffeine -40 MG per capsule Take 1 capsule by mouth Every 4 (Four) Hours As Needed for headaches.   Yes Tresa Ag MD   SYNTHROID 125 MCG tablet Take 0.5 tablets by mouth Daily. 8/15/18 8/15/19 Yes George Love MD   Coenzyme Q10 (COQ10 PO) Take 200 mg by mouth.    Tresa gA MD   levocetirizine (XYZAL) 5 MG tablet Take 1 tablet by mouth Daily. 9/12/17   Leonel Delacruz APRN   omeprazole (priLOSEC) 20 MG capsule Take 20 mg by mouth Daily.    Tresa Ag MD   Red Yeast Rice Extract (RED YEAST RICE PO) Take 600 mg by mouth.    Tresa Ag MD       Allergies   Allergen Reactions   • Penicillins Rash       Family History   Problem Relation Age of Onset   • Heart attack Father    • Cancer Maternal Aunt         breast and pancreatic cancer    • Breast cancer Maternal Aunt    • Pancreatic cancer Maternal Aunt    •  Cancer Maternal Grandmother         pancreatic cancer     • Kidney failure Maternal Grandmother    • Cancer Paternal Grandmother         brain tumor        OBJECTIVE:    Patient's vital signs reviewed. No acute distress.     Chest is clear, no wheezing or rales. Normal symmetric air entry throughout both lung fields. No chest wall deformities or tenderness.    S1 and S2 normal, no murmurs, clicks, gallops or rubs. Regular rate and rhythm. Chest is clear; no wheezes or rales. No edema or JVD.    The abdomen is soft without tenderness, guarding, mass, rebound or organomegaly. Bowel sounds are normal. No CVA tenderness or inguinal adenopathy noted.    Patient is alert, oriented and with an intact memory.    Assessment: Dysphagia    Plan: EGD

## 2018-10-12 NOTE — ANESTHESIA PREPROCEDURE EVALUATION
Anesthesia Evaluation     Patient summary reviewed   NPO Solid Status: > 8 hours  NPO Liquid Status: > 8 hours           Airway   Mallampati: I  TM distance: >3 FB  Dental      Pulmonary    Cardiovascular     Rhythm: regular  Rate: normal    (+) hyperlipidemia,       Neuro/Psych  (+) headaches,     GI/Hepatic/Renal/Endo    (+)  GERD,  hypothyroidism,     Musculoskeletal     Abdominal    Substance History      OB/GYN          Other                        Anesthesia Plan    ASA 2     MAC   total IV anesthesia  Anesthetic plan, all risks, benefits, and alternatives have been provided, discussed and informed consent has been obtained with: patient.

## 2018-10-12 NOTE — ANESTHESIA POSTPROCEDURE EVALUATION
"Patient: Zi Jasso    Procedure Summary     Date:  10/12/18 Room / Location:   [facility]    Anesthesia Start:  1028 Anesthesia Stop:  1046    Procedure:  ESOPHAGOGASTRODUODENOSCOPY WITH BIOPSY (N/A Esophagus) Diagnosis:       Other chest pain      Heartburn      Esophageal dysphagia      (Other chest pain [R07.89])      (Heartburn [R12])      (Esophageal dysphagia [R13.10])    Surgeon:  Jamin John MD Provider:  Evelyn Mejia MD    Anesthesia Type:  MAC ASA Status:  2          Anesthesia Type: MAC  Last vitals  BP   106/78 (10/12/18 1105)   Temp   36.9 °C (98.5 °F) (10/12/18 0949)   Pulse   59 (10/12/18 1105)   Resp   16 (10/12/18 0949)     SpO2   97 % (10/12/18 1105)     Post Anesthesia Care and Evaluation    Patient location during evaluation: bedside  Patient participation: complete - patient participated  Level of consciousness: awake and alert  Pain management: adequate  Airway patency: patent  Anesthetic complications: No anesthetic complications  PONV Status: none  Cardiovascular status: acceptable  Respiratory status: acceptable  Hydration status: acceptable    Comments: /78 (BP Location: Left arm, Patient Position: Lying)   Pulse 59   Temp 36.9 °C (98.5 °F) (Oral)   Resp 16   Ht 165.1 cm (65\")   Wt 62.2 kg (137 lb 3 oz)   SpO2 97%   BMI 22.83 kg/m²         "

## 2018-10-15 LAB
CYTO UR: NORMAL
LAB AP CASE REPORT: NORMAL
PATH REPORT.FINAL DX SPEC: NORMAL
PATH REPORT.GROSS SPEC: NORMAL

## 2018-10-16 ENCOUNTER — OFFICE VISIT (OUTPATIENT)
Dept: ENDOCRINOLOGY | Age: 53
End: 2018-10-16

## 2018-10-16 VITALS
BODY MASS INDEX: 27.29 KG/M2 | HEIGHT: 60 IN | SYSTOLIC BLOOD PRESSURE: 98 MMHG | WEIGHT: 139 LBS | DIASTOLIC BLOOD PRESSURE: 68 MMHG | HEART RATE: 81 BPM

## 2018-10-16 DIAGNOSIS — E03.9 ADULT HYPOTHYROIDISM: Primary | ICD-10-CM

## 2018-10-16 PROCEDURE — 99214 OFFICE O/P EST MOD 30 MIN: CPT | Performed by: INTERNAL MEDICINE

## 2018-10-16 RX ORDER — LEVOTHYROXINE SODIUM 125 MCG
62.5 TABLET ORAL DAILY
Qty: 45 TABLET | Refills: 2 | Status: SHIPPED | OUTPATIENT
Start: 2018-10-16 | End: 2019-01-28

## 2018-10-16 NOTE — PROGRESS NOTES
53 y.o.    Patient Care Team:  Felipe Cedeno MD as PCP - General (Family Medicine)    Chief Complaint:      F/u hypothyroidism.  Here to discuss lab results.    Subjective     HPI   Patient is a 53-year-old white female with a history of hypothyroidism came for follow-up    Hypothyroidism  Patient is currently taking Synthroid 62.5 µg daily for the past 2 months  Initially she did not feel any different but within the past 2 weeks he reports that her fatigue is improved she has more energy.  She is not expressing any symptoms of hyperthyroidism for the past 2 weeks  Hashimoto's thyroiditis  Patient's lab reports revealed that her TPO is elevated confirming Hashimoto's  Chronic fatigue  Patient has been fatigued for a very long time  She reports slight improvement in the past 2 weeks  Iatrogenic hyperthyroidism  Patient's TSH was in the 0.2-0.3 range for a long time and I suspect that she was hyper thyroid  I decrease the dose of Synthroid and she is actually feeling slightly better within the past 2 weeks.  Hyperlipidemia  Patient reported that she had significant problems with statin medication and is currently not able to take any.    Interval History    Patient is a 53-year-old white female with a past history of hypothyroidism for more than 20 years came for a new patient consultation      reports that she was diagnosed with hypothyroidism when she was living in [place] in 1999 and has been on medication ever since  She has been on 75 µg Synthroid for a very long time  She was not able to tolerate the levothyroxine generic hence she was on brand name Synthroid     Patient takes the Synthroid early morning on an empty stomach  She does not eat or drink for at least 2 hours if not longer  She is taking all her medication later in the day  Chronic fatigue  Patient has had fatigue for a very long time but in the past few months her fatigue is getting worse  She used to stay up late until 1:00 or 2:00 AM but  now she feels extremely sleepy and tired by 9 PM and goes to bed  Insomnia chronic  Patient reports that despite chronic insomnia her sleep pattern has changed over the past few months and she is going to bed early and waking up late  She has no energy after long night sleep  Patient is also been reporting increasing sweating, shaking, occasional chest pains and chest pressure, stomach rumbling constantly and increasing hunger at times and significant hair loss.  She denied any specific instances of palpitations and she has been drinking more caffeine to stay up alert during the day     Patient is unaware of Hashimoto's thyroiditis but has a strong family history of thyroid problems on both sides of the family  She is also been expressing chronic headaches     Hyperlipidemia  Patient used to be on statins including Crestor.  Apparently she had significant problems with the statin medication.  She started utilizing red yeast Rice extract over the past couple years  She reports that her lipid profile is improved significantly.  She recently started experiencing lower abdominal symptoms stomach rumbling and achiness and discontinued this extract a few months ago and feels only slightly better now  The following portions of the patient's history were reviewed and updated as appropriate: allergies, current medications, past family history, past medical history, past social history, past surgical history and problem list.    Past Medical History:   Diagnosis Date   • Allergic    • Chronic headaches    • Fibrocystic breast    • Hyperlipidemia    • Hypothyroidism      Family History   Problem Relation Age of Onset   • Heart attack Father    • Cancer Maternal Aunt         breast and pancreatic cancer    • Breast cancer Maternal Aunt    • Pancreatic cancer Maternal Aunt    • Cancer Maternal Grandmother         pancreatic cancer     • Kidney failure Maternal Grandmother    • Cancer Paternal Grandmother         brain tumor   "    Social History     Social History   • Marital status:      Spouse name: N/A   • Number of children: N/A   • Years of education: N/A     Occupational History   • Not on file.     Social History Main Topics   • Smoking status: Never Smoker   • Smokeless tobacco: Never Used   • Alcohol use Yes      Comment: Rare   • Drug use: No   • Sexual activity: Defer     Other Topics Concern   • Not on file     Social History Narrative   • No narrative on file     Allergies   Allergen Reactions   • Penicillins Rash       Current Outpatient Prescriptions:   •  azelastine (ASTELIN) 0.1 % nasal spray, SPRAY ONCE IN EACH NOSTRIL TWICE DAILY, Disp: 90 mL, Rfl: 0  •  Butalbital-APAP-Caffeine -40 MG per capsule, Take 1 capsule by mouth Every 4 (Four) Hours As Needed for headaches., Disp: , Rfl:   •  Coenzyme Q10 (COQ10 PO), Take 200 mg by mouth., Disp: , Rfl:   •  levocetirizine (XYZAL) 5 MG tablet, Take 1 tablet by mouth Daily., Disp: 90 tablet, Rfl: 3  •  omeprazole (priLOSEC) 20 MG capsule, Take 20 mg by mouth Daily., Disp: , Rfl:   •  Red Yeast Rice Extract (RED YEAST RICE PO), Take 600 mg by mouth., Disp: , Rfl:   •  SYNTHROID 125 MCG tablet, Take 0.5 tablets by mouth Daily., Disp: 45 tablet, Rfl: 2        Review of Systems   Constitutional: Negative for chills, fatigue and fever.   Cardiovascular: Positive for chest pain. Negative for palpitations.   Gastrointestinal: Negative for abdominal pain, constipation, diarrhea, nausea and vomiting.   Endocrine: Negative for cold intolerance and heat intolerance.   All other systems reviewed and are negative.      Objective       Vitals:    10/16/18 1201   BP: 98/68   Pulse: 81   Weight: 63 kg (139 lb)   Height: 152.4 cm (60\")     Body mass index is 27.15 kg/m².      Physical Exam   Constitutional: She is oriented to person, place, and time. She appears well-developed and well-nourished.   HENT:   Head: Normocephalic and atraumatic.   Eyes: Pupils are equal, round, and " reactive to light. EOM are normal.   Neck: Normal range of motion. Neck supple. No thyromegaly present.   Cardiovascular: Normal rate, regular rhythm, normal heart sounds and intact distal pulses.    Pulmonary/Chest: Effort normal and breath sounds normal.   Abdominal: Soft. Bowel sounds are normal. She exhibits no distension.   Musculoskeletal: Normal range of motion. She exhibits no edema.   Neurological: She is alert and oriented to person, place, and time. She displays normal reflexes.   Skin: Skin is warm and dry.   Psychiatric: She has a normal mood and affect. Her behavior is normal.   Nursing note and vitals reviewed.    Results Review:     I reviewed the patient's new clinical results.    Medical records reviewed  Summary:      Admission on 10/12/2018, Discharged on 10/12/2018   Component Date Value Ref Range Status   • Case Report 10/12/2018    Final                    Value:Surgical Pathology Report                         Case: [id number]                                  Authorizing Provider:  Jamin John MD       Collected:           10/12/2018 10:41 AM          Ordering Location:     [facility]  Received:            10/12/2018 12:02 PM                                 [facility]                                                                  Pathologist:           Ney Restrepo MD                                                         Specimen:    Esophagus, Mid, MID ESOPHAGEAL BIOPSY                                                     • Final Diagnosis 10/12/2018    Final                    Value:This result contains rich text formatting which cannot be displayed here.   • Gross Description 10/12/2018    Final                    Value:This result contains rich text formatting which cannot be displayed here.   • Microscopic Description 10/12/2018    Final                    Value:This result contains rich text formatting which cannot be displayed here.     No results found  "for: HGBA1C  Lab Results   Component Value Date    CREATININE 0.88 06/05/2018     Imaging Results (most recent)     None                Assessment and Plan:    There are no diagnoses linked to this encounter.    Adult hypothyroidism  -     T4, Free; Future  -     TSH; Future  -     Thyroid Peroxidase Antibody; Future     Hyperlipidemia, unspecified hyperlipidemia type  -     T4, Free; Future  -     TSH; Future  -     Thyroid Peroxidase Antibody; Future     Hashimoto's disease     Chronic fatigue     Iatrogenic hyperthyroidism     Other orders  -     SYNTHROID 125 MCG tablet; Take 0.5 tablets by mouth Daily.      Patient reports that just within the past 2 weeks she started feeling slightly better  She has more energy and fatigue level has improved  Lab reports reviewed  TSH is 2.5 and free T4 is 1.2    Patient also reports that she had extensive investigation of chest pain related symptoms and had stress test, MRI of the chest both were normal  She also had EGD and had a biopsy is last week    Patient is clearly not manifesting any symptoms of hyperthyroidism at this time  Her thyroid levels appear more in the normal range  I advised the patient to start taking vitamin D3 daily 2000 units  I'll continue the current dose of 62.5 µg Synthroid daily  Patient will return to follow-up in 3 months     The total time spent  was more than 25 min of which greater than 15 min of time (greater than 50% of the total time)  was spent face to face with the patient counseling and coordination of care on recommended evaluation and treatment options, instructions for management/treatment and /or follow up and importance of compliance with chosen management or treatment options   George Love MD. FACE    10/16/18      EMR Dragon / transcription disclaimer:     \"Dictated utilizing Dragon dictation\".         "

## 2018-10-21 ENCOUNTER — RESULTS ENCOUNTER (OUTPATIENT)
Dept: ENDOCRINOLOGY | Age: 53
End: 2018-10-21

## 2018-10-21 DIAGNOSIS — E03.9 ADULT HYPOTHYROIDISM: ICD-10-CM

## 2018-10-22 ENCOUNTER — TELEPHONE (OUTPATIENT)
Dept: GASTROENTEROLOGY | Facility: CLINIC | Age: 53
End: 2018-10-22

## 2018-10-22 ENCOUNTER — OFFICE VISIT (OUTPATIENT)
Dept: GASTROENTEROLOGY | Facility: CLINIC | Age: 53
End: 2018-10-22

## 2018-10-22 DIAGNOSIS — R13.19 ESOPHAGEAL DYSPHAGIA: ICD-10-CM

## 2018-10-22 DIAGNOSIS — K21.9 GASTROESOPHAGEAL REFLUX DISEASE WITHOUT ESOPHAGITIS: Primary | ICD-10-CM

## 2018-10-22 PROCEDURE — 99214 OFFICE O/P EST MOD 30 MIN: CPT | Performed by: INTERNAL MEDICINE

## 2018-10-22 RX ORDER — ESOMEPRAZOLE MAGNESIUM 40 MG/1
40 CAPSULE, DELAYED RELEASE ORAL DAILY
Qty: 30 CAPSULE | Refills: 5 | Status: SHIPPED | OUTPATIENT
Start: 2018-10-22 | End: 2019-01-29

## 2018-10-22 NOTE — TELEPHONE ENCOUNTER
Patient needs 2 month fu appointment. Sometime last of December or first part of January. She needed to look at her calendar before scheduling

## 2018-10-22 NOTE — PROGRESS NOTES
Subjective   Zi Jasso is a 53 y.o.. female is here today for follow-up.    Chief Complaint   Patient presents with   • Heartburn     Discuss EGD Results   • Chest Pain   • Abdominal Pain       History of Present Illness  Patient returns to discuss her recent EGD.  It demonstrated a nonspecific inflammatory pattern in the esophagus.  The biopsies 2 were nonspecific and demonstrated inflammation.  Gastric biopsies did not demonstrate any evidence of Helicobacter.  She continues to have her symptoms of heartburn on an intermittent basis, and oftentimes once a start there were last all day.  She is not aware of anything particular that triggers it.  She describes the symptoms as moderate in severity.    The following portions of the patient's history were reviewed and updated as appropriate: allergies, current medications, past family history, past medical history, past social history, past surgical history and problem list.      Current Outpatient Prescriptions:   •  azelastine (ASTELIN) 0.1 % nasal spray, SPRAY ONCE IN EACH NOSTRIL TWICE DAILY, Disp: 90 mL, Rfl: 0  •  Butalbital-APAP-Caffeine -40 MG per capsule, Take 1 capsule by mouth Every 4 (Four) Hours As Needed for headaches., Disp: , Rfl:   •  levocetirizine (XYZAL) 5 MG tablet, Take 1 tablet by mouth Daily., Disp: 90 tablet, Rfl: 3  •  SYNTHROID 125 MCG tablet, Take 0.5 tablets by mouth Daily., Disp: 45 tablet, Rfl: 2  •  esomeprazole (nexIUM) 40 MG capsule, Take 1 capsule by mouth Daily., Disp: 30 capsule, Rfl: 5    Family History   Problem Relation Age of Onset   • Heart attack Father    • Cancer Maternal Aunt         breast and pancreatic cancer    • Breast cancer Maternal Aunt    • Pancreatic cancer Maternal Aunt    • Cancer Maternal Grandmother         pancreatic cancer     • Kidney failure Maternal Grandmother    • Cancer Paternal Grandmother         brain tumor        Review of Systems   Respiratory: Negative for shortness of breath.     Cardiovascular: Negative for chest pain.   All other systems reviewed and are negative.      Objective   Physical Exam   Constitutional: She appears well-developed and well-nourished.   Nursing note and vitals reviewed.      Pertinent old records were reviewed.  and Pertinent medical tests were reviewed.     Assessment/Plan   Problems Addressed this Visit        Digestive    Gastroesophageal reflux disease without esophagitis - Primary    Relevant Medications    esomeprazole (nexIUM) 40 MG capsule    Esophageal dysphagia    Relevant Medications    esomeprazole (nexIUM) 40 MG capsule        Her esophageal inflammation is nonspecific.  Approach at this time will be to increase acid suppression until she is asymptomatic.  We will switch her over to Nexium 40 mg first thing in the morning, and if that does not help we will increase it to twice daily.  We'll see her back in mid see how she is doing.

## 2018-10-22 NOTE — PATIENT INSTRUCTIONS
Avoid eating late at night, eating large meals, eating fatty foods, chocolate, mints, fried food, or food with high acid content such as orange juice or tomato paste.

## 2018-10-29 ENCOUNTER — TELEPHONE (OUTPATIENT)
Dept: GASTROENTEROLOGY | Facility: CLINIC | Age: 53
End: 2018-10-29

## 2018-10-31 ENCOUNTER — TELEPHONE (OUTPATIENT)
Dept: ENDOCRINOLOGY | Age: 53
End: 2018-10-31

## 2018-10-31 NOTE — TELEPHONE ENCOUNTER
----- Message from Natalee Sabillon MA sent at 10/31/2018 10:48 AM EDT -----  Contact: PT  PT CALLED ASKED TO BE CALLED BACK TO CLARIFY THE NUMBER OF LABS HE WANT HER TO HAVE. HE SAID 3 THINGS BUT THERE IS ONLY 2 ON THE LAB ORDER FORM.    Patient only needs Free T4 and Tsh. Tried calling patient, no answer and unable to leave a message.

## 2018-12-04 ENCOUNTER — RESULTS ENCOUNTER (OUTPATIENT)
Dept: ENDOCRINOLOGY | Age: 53
End: 2018-12-04

## 2018-12-04 DIAGNOSIS — E03.9 ADULT HYPOTHYROIDISM: ICD-10-CM

## 2019-01-07 DIAGNOSIS — E06.3 HASHIMOTO'S DISEASE: ICD-10-CM

## 2019-01-07 DIAGNOSIS — E03.9 ADULT HYPOTHYROIDISM: Primary | ICD-10-CM

## 2019-01-08 ENCOUNTER — LAB (OUTPATIENT)
Dept: ENDOCRINOLOGY | Age: 54
End: 2019-01-08

## 2019-01-08 ENCOUNTER — RESULTS ENCOUNTER (OUTPATIENT)
Dept: ENDOCRINOLOGY | Age: 54
End: 2019-01-08

## 2019-01-08 DIAGNOSIS — E06.3 HASHIMOTO'S DISEASE: ICD-10-CM

## 2019-01-08 DIAGNOSIS — E03.9 ADULT HYPOTHYROIDISM: ICD-10-CM

## 2019-01-09 LAB
ALBUMIN SERPL-MCNC: 4.2 G/DL (ref 3.5–5.2)
ALBUMIN/GLOB SERPL: 1.4 G/DL
ALP SERPL-CCNC: 92 U/L (ref 39–117)
ALT SERPL-CCNC: 40 U/L (ref 1–33)
AST SERPL-CCNC: 40 U/L (ref 1–32)
BILIRUB SERPL-MCNC: 1 MG/DL (ref 0.1–1.2)
BUN SERPL-MCNC: 13 MG/DL (ref 6–20)
BUN/CREAT SERPL: 16.9 (ref 7–25)
CALCIUM SERPL-MCNC: 9.8 MG/DL (ref 8.6–10.5)
CHLORIDE SERPL-SCNC: 103 MMOL/L (ref 98–107)
CO2 SERPL-SCNC: 27.9 MMOL/L (ref 22–29)
CREAT SERPL-MCNC: 0.77 MG/DL (ref 0.57–1)
GLOBULIN SER CALC-MCNC: 3 GM/DL
GLUCOSE SERPL-MCNC: 88 MG/DL (ref 65–99)
POTASSIUM SERPL-SCNC: 4.3 MMOL/L (ref 3.5–5.2)
PROT SERPL-MCNC: 7.2 G/DL (ref 6–8.5)
SODIUM SERPL-SCNC: 142 MMOL/L (ref 136–145)
T4 FREE SERPL-MCNC: 1.06 NG/DL (ref 0.93–1.7)
THYROPEROXIDASE AB SERPL-ACNC: 197 IU/ML (ref 0–34)
TSH SERPL DL<=0.005 MIU/L-ACNC: 4.29 MIU/ML (ref 0.27–4.2)

## 2019-01-16 ENCOUNTER — TELEPHONE (OUTPATIENT)
Dept: GASTROENTEROLOGY | Facility: CLINIC | Age: 54
End: 2019-01-16

## 2019-01-16 NOTE — TELEPHONE ENCOUNTER
Patient called move appointment for tomorrow 1-17-19. To 2-21-19 at 1 pm. Said she is doing better. She still has 30 pills.she wants to take and see how she is doing then.

## 2019-01-28 ENCOUNTER — OFFICE VISIT (OUTPATIENT)
Dept: ENDOCRINOLOGY | Age: 54
End: 2019-01-28

## 2019-01-28 VITALS
HEIGHT: 60 IN | SYSTOLIC BLOOD PRESSURE: 98 MMHG | BODY MASS INDEX: 27.25 KG/M2 | DIASTOLIC BLOOD PRESSURE: 76 MMHG | WEIGHT: 138.8 LBS | HEART RATE: 80 BPM

## 2019-01-28 DIAGNOSIS — R53.82 CHRONIC FATIGUE: ICD-10-CM

## 2019-01-28 DIAGNOSIS — E78.5 HYPERLIPIDEMIA, UNSPECIFIED HYPERLIPIDEMIA TYPE: ICD-10-CM

## 2019-01-28 DIAGNOSIS — R42 DIZZINESS: ICD-10-CM

## 2019-01-28 DIAGNOSIS — E06.3 HASHIMOTO'S DISEASE: ICD-10-CM

## 2019-01-28 DIAGNOSIS — E03.9 ADULT HYPOTHYROIDISM: Primary | ICD-10-CM

## 2019-01-28 PROCEDURE — 99214 OFFICE O/P EST MOD 30 MIN: CPT | Performed by: INTERNAL MEDICINE

## 2019-01-28 RX ORDER — LEVOTHYROXINE SODIUM 75 MCG
75 TABLET ORAL DAILY
Qty: 90 TABLET | Refills: 2 | Status: SHIPPED | OUTPATIENT
Start: 2019-01-28 | End: 2019-03-19 | Stop reason: SDUPTHER

## 2019-01-28 NOTE — PROGRESS NOTES
53 y.o.    Patient Care Team:  Felipe Cedeno MD as PCP - General (Family Medicine)    Chief Complaint:      F/U HYPOTHYROIDISM.  HERE TO DISCUSS LAB RESULTS.  Subjective     HPI   Patient is a 53-year-old white female with a history of hypothyroidism and Hashimoto's thyroiditis came for follow-up    Hypothyroidism  Patient is currently taking Synthroid 62.5 µg daily  She reports that she had been compliant with the medication  Her energy levels are better since starting Synthroid  Gastroesophageal reflux disease  Patient reports that she started taking Nexium along with Synthroid in the morning for the past several months  She has been feeling dizzy for the past 3 months and is currently under evaluation by ENT  Apparently Epply's did not help  She is wondering if she needs to see a neurologist    Patient reported that she does not drink enough water  She also does not taking vitamin D  Chronic fatigue  Somewhat improved since starting the Synthroid  Iatrogenic hyperthyroidism  Patient's TSH is actually reflecting subclinical hypothyroidism  Hyperlipidemia  Patient is on no medication at this time since she could not take any statins      Interval History    Patient is a 53-year-old white female with a past history of hypothyroidism for more than 20 years came for a new patient consultation      reports that she was diagnosed with hypothyroidism when she was living in [place] in 1999 and has been on medication ever since  She has been on 75 µg Synthroid for a very long time  She was not able to tolerate the levothyroxine generic hence she was on brand name Synthroid     Patient takes the Synthroid early morning on an empty stomach  She does not eat or drink for at least 2 hours if not longer  She is taking all her medication later in the day  Chronic fatigue  Patient has had fatigue for a very long time but in the past few months her fatigue is getting worse  She used to stay up late until 1:00 or 2:00 AM  but now she feels extremely sleepy and tired by 9 PM and goes to bed  Insomnia chronic  Patient reports that despite chronic insomnia her sleep pattern has changed over the past few months and she is going to bed early and waking up late  She has no energy after long night sleep  Patient is also been reporting increasing sweating, shaking, occasional chest pains and chest pressure, stomach rumbling constantly and increasing hunger at times and significant hair loss.  She denied any specific instances of palpitations and she has been drinking more caffeine to stay up alert during the day     Patient is unaware of Hashimoto's thyroiditis but has a strong family history of thyroid problems on both sides of the family  She is also been expressing chronic headaches     Hyperlipidemia  Patient used to be on statins including Crestor.  Apparently she had significant problems with the statin medication.  She started utilizing red yeast Rice extract over the past couple years  She reports that her lipid profile is improved significantly.  She recently started experiencing lower abdominal symptoms stomach rumbling and achiness and discontinued this extract a few months ago and feels only slightly better now        The following portions of the patient's history were reviewed and updated as appropriate: allergies, current medications, past family history, past medical history, past social history, past surgical history and problem list.    Past Medical History:   Diagnosis Date   • Allergic    • Chronic headaches    • Fibrocystic breast    • Hyperlipidemia    • Hypothyroidism      Family History   Problem Relation Age of Onset   • Heart attack Father    • Cancer Maternal Aunt         breast and pancreatic cancer    • Breast cancer Maternal Aunt    • Pancreatic cancer Maternal Aunt    • Cancer Maternal Grandmother         pancreatic cancer     • Kidney failure Maternal Grandmother    • Cancer Paternal Grandmother         brain  "tumor      Social History     Socioeconomic History   • Marital status:      Spouse name: Not on file   • Number of children: Not on file   • Years of education: Not on file   • Highest education level: Not on file   Social Needs   • Financial resource strain: Not on file   • Food insecurity - worry: Not on file   • Food insecurity - inability: Not on file   • Transportation needs - medical: Not on file   • Transportation needs - non-medical: Not on file   Occupational History   • Not on file   Tobacco Use   • Smoking status: Never Smoker   • Smokeless tobacco: Never Used   Substance and Sexual Activity   • Alcohol use: Yes     Comment: Rare   • Drug use: No   • Sexual activity: Defer   Other Topics Concern   • Not on file   Social History Narrative   • Not on file     Allergies   Allergen Reactions   • Penicillins Rash       Current Outpatient Medications:   •  azelastine (ASTELIN) 0.1 % nasal spray, SPRAY ONCE IN EACH NOSTRIL TWICE DAILY, Disp: 90 mL, Rfl: 0  •  Butalbital-APAP-Caffeine -40 MG per capsule, Take 1 capsule by mouth Every 4 (Four) Hours As Needed for headaches., Disp: , Rfl:   •  esomeprazole (nexIUM) 40 MG capsule, Take 1 capsule by mouth Daily., Disp: 30 capsule, Rfl: 5  •  levocetirizine (XYZAL) 5 MG tablet, Take 1 tablet by mouth Daily., Disp: 90 tablet, Rfl: 3  •  SYNTHROID 125 MCG tablet, Take 0.5 tablets by mouth Daily., Disp: 45 tablet, Rfl: 2        Review of Systems   Constitutional: Positive for fatigue. Negative for chills and fever.   Cardiovascular: Negative for chest pain.   Gastrointestinal: Negative for abdominal pain, constipation, diarrhea, nausea and vomiting.   Endocrine: Negative for cold intolerance and heat intolerance.   Neurological: Positive for dizziness.   All other systems reviewed and are negative.      Objective       Vitals:    01/28/19 1033   BP: 98/76   Pulse: 80   Weight: 63 kg (138 lb 12.8 oz)   Height: 152.4 cm (60\")     Body mass index is 27.11 " kg/m².      Physical Exam   Constitutional: She is oriented to person, place, and time. She appears well-developed and well-nourished.   HENT:   Head: Normocephalic and atraumatic.   Eyes: EOM are normal. Pupils are equal, round, and reactive to light.   Neck: Normal range of motion. Neck supple. No thyromegaly present.   Cardiovascular: Normal rate, regular rhythm, normal heart sounds and intact distal pulses.   Pulmonary/Chest: Effort normal and breath sounds normal.   Abdominal: Soft. Bowel sounds are normal. She exhibits no distension.   Musculoskeletal: Normal range of motion. She exhibits no edema.   Neurological: She is alert and oriented to person, place, and time. She displays normal reflexes.   Skin: Skin is warm and dry.   Psychiatric: She has a normal mood and affect. Her behavior is normal.   Nursing note and vitals reviewed.    Results Review:     I reviewed the patient's new clinical results.    Medical records reviewed  Summary:      Results Encounter on 01/08/2019   Component Date Value Ref Range Status   • Glucose 01/08/2019 88  65 - 99 mg/dL Final   • BUN 01/08/2019 13  6 - 20 mg/dL Final   • Creatinine 01/08/2019 0.77  0.57 - 1.00 mg/dL Final   • eGFR Non  Am 01/08/2019 78  >60 mL/min/1.73 Final   • eGFR African Am 01/08/2019 95  >60 mL/min/1.73 Final   • BUN/Creatinine Ratio 01/08/2019 16.9  7.0 - 25.0 Final   • Sodium 01/08/2019 142  136 - 145 mmol/L Final   • Potassium 01/08/2019 4.3  3.5 - 5.2 mmol/L Final   • Chloride 01/08/2019 103  98 - 107 mmol/L Final   • Total CO2 01/08/2019 27.9  22.0 - 29.0 mmol/L Final   • Calcium 01/08/2019 9.8  8.6 - 10.5 mg/dL Final   • Total Protein 01/08/2019 7.2  6.0 - 8.5 g/dL Final   • Albumin 01/08/2019 4.20  3.50 - 5.20 g/dL Final   • Globulin 01/08/2019 3.0  gm/dL Final   • A/G Ratio 01/08/2019 1.4  g/dL Final   • Total Bilirubin 01/08/2019 1.0  0.1 - 1.2 mg/dL Final   • Alkaline Phosphatase 01/08/2019 92  39 - 117 U/L Final   • AST (SGOT)  01/08/2019 40* 1 - 32 U/L Final   • ALT (SGPT) 01/08/2019 40* 1 - 33 U/L Final   • Free T4 01/08/2019 1.06  0.93 - 1.70 ng/dL Final   • TSH 01/08/2019 4.290* 0.270 - 4.200 mIU/mL Final   • Thyroid Peroxidase Antibody 01/08/2019 197* 0 - 34 IU/mL Final     No results found for: HGBA1C  Lab Results   Component Value Date    CREATININE 0.77 01/08/2019     Imaging Results (most recent)     None          Assessment and Plan:    Zi was seen today for hypothyroidism.    Diagnoses and all orders for this visit:    Adult hypothyroidism  -     T4, Free; Standing  -     TSH; Standing    Hashimoto's disease  -     T4, Free; Standing  -     TSH; Standing    Chronic fatigue    Hyperlipidemia, unspecified hyperlipidemia type    Dizziness    Other orders  -     SYNTHROID 75 MCG tablet; Take 1 tablet by mouth Daily.        Patient reports that she was taking Nexium and Synthroid at the same time in the morning for several weeks  She started experiencing dizziness and lightheadedness and has been going to ENT  She still has the symptoms  She finally stopped Nexium 4 days ago and started feeling better slightly  She may need to see a neurologist    I advised the patient to take nexium any other PPIs in the evening  Only Synthroid should be in the morning    I also recommended that patient must drink at least 3 bottles of water 16 ounces each daily  She is also advised to take vitamin D3 daily 2000 units at dinner    Lab reports reviewed  TSH is slightly higher at 4.27  Her optimal TSH should be between 1-3  I advised 75 µg Synthroid daily.  She will get lab testing done in 6 weeks and in 12 weeks  Patient will return to follow-up in 3 months    The total time spent  was more than 25 min of which greater than 15 min of time (greater than 50% of the total time)  was spent face to face with the patient counseling and coordination of care on recommended evaluation and treatment options, instructions for management/treatment and /or  "follow up and importance of compliance with chosen management or treatment options  George Love MD. FACE    01/28/19      EMR Dragon / transcription disclaimer:     \"Dictated utilizing Dragon dictation\".         "

## 2019-01-29 ENCOUNTER — OFFICE VISIT (OUTPATIENT)
Dept: GASTROENTEROLOGY | Facility: CLINIC | Age: 54
End: 2019-01-29

## 2019-01-29 ENCOUNTER — TELEPHONE (OUTPATIENT)
Dept: GASTROENTEROLOGY | Facility: CLINIC | Age: 54
End: 2019-01-29

## 2019-01-29 VITALS
DIASTOLIC BLOOD PRESSURE: 84 MMHG | HEIGHT: 60 IN | WEIGHT: 135 LBS | HEART RATE: 73 BPM | BODY MASS INDEX: 26.5 KG/M2 | SYSTOLIC BLOOD PRESSURE: 125 MMHG

## 2019-01-29 DIAGNOSIS — K21.9 GASTROESOPHAGEAL REFLUX DISEASE WITHOUT ESOPHAGITIS: Primary | ICD-10-CM

## 2019-01-29 PROCEDURE — 99213 OFFICE O/P EST LOW 20 MIN: CPT | Performed by: INTERNAL MEDICINE

## 2019-01-29 RX ORDER — FAMOTIDINE 20 MG/1
20 TABLET, FILM COATED ORAL 2 TIMES DAILY
Qty: 60 TABLET | Refills: 2 | Status: SHIPPED | OUTPATIENT
Start: 2019-01-29 | End: 2019-08-02

## 2019-01-29 NOTE — TELEPHONE ENCOUNTER
Patient said she will call to schedule 6 week follow up appointment. She needs to be seen around March 12, 2019

## 2019-03-06 ENCOUNTER — LAB (OUTPATIENT)
Dept: ENDOCRINOLOGY | Age: 54
End: 2019-03-06

## 2019-03-06 DIAGNOSIS — E03.9 ADULT HYPOTHYROIDISM: ICD-10-CM

## 2019-03-06 DIAGNOSIS — E06.3 HASHIMOTO'S DISEASE: ICD-10-CM

## 2019-03-06 LAB
T4 FREE SERPL-MCNC: 1.58 NG/DL (ref 0.93–1.7)
TSH SERPL DL<=0.005 MIU/L-ACNC: 0.47 MIU/ML (ref 0.27–4.2)

## 2019-03-19 RX ORDER — LEVOTHYROXINE SODIUM 75 MCG
75 TABLET ORAL DAILY
Qty: 90 TABLET | Refills: 0 | Status: SHIPPED | OUTPATIENT
Start: 2019-03-19 | End: 2019-04-19

## 2019-03-28 ENCOUNTER — APPOINTMENT (OUTPATIENT)
Dept: WOMENS IMAGING | Facility: HOSPITAL | Age: 54
End: 2019-03-28

## 2019-03-28 PROCEDURE — 77067 SCR MAMMO BI INCL CAD: CPT | Performed by: RADIOLOGY

## 2019-03-28 PROCEDURE — 77063 BREAST TOMOSYNTHESIS BI: CPT | Performed by: RADIOLOGY

## 2019-04-01 ENCOUNTER — TELEPHONE (OUTPATIENT)
Dept: GASTROENTEROLOGY | Facility: CLINIC | Age: 54
End: 2019-04-01

## 2019-04-01 NOTE — TELEPHONE ENCOUNTER
"Pt brought in insurance paperwork for Dr John to review.  PCP ordered blood test for H.Pylori but insurance denied it because it was considered \"investigational\".  Pt is trying to appeal decision for a second time.  Dr John wrote appeals letter on pt's behalf 10/24/18, but insurance denied.    Dr John tried to find research to support blood test but could not find anything. He will not be able to write another appeals letter supporting necessity of blood test. Pt notified.  "

## 2019-04-15 ENCOUNTER — LAB (OUTPATIENT)
Dept: ENDOCRINOLOGY | Age: 54
End: 2019-04-15

## 2019-04-15 DIAGNOSIS — E06.3 HASHIMOTO'S DISEASE: ICD-10-CM

## 2019-04-15 DIAGNOSIS — E03.9 ADULT HYPOTHYROIDISM: ICD-10-CM

## 2019-04-15 LAB
T4 FREE SERPL-MCNC: 1.42 NG/DL (ref 0.93–1.7)
TSH SERPL DL<=0.005 MIU/L-ACNC: 0.12 MIU/ML (ref 0.27–4.2)

## 2019-04-19 ENCOUNTER — OFFICE VISIT (OUTPATIENT)
Dept: ENDOCRINOLOGY | Age: 54
End: 2019-04-19

## 2019-04-19 VITALS
WEIGHT: 134.2 LBS | HEIGHT: 60 IN | DIASTOLIC BLOOD PRESSURE: 60 MMHG | HEART RATE: 74 BPM | SYSTOLIC BLOOD PRESSURE: 94 MMHG | BODY MASS INDEX: 26.35 KG/M2

## 2019-04-19 DIAGNOSIS — E78.5 HYPERLIPIDEMIA, UNSPECIFIED HYPERLIPIDEMIA TYPE: ICD-10-CM

## 2019-04-19 DIAGNOSIS — R53.82 CHRONIC FATIGUE: ICD-10-CM

## 2019-04-19 DIAGNOSIS — E03.9 ADULT HYPOTHYROIDISM: Primary | ICD-10-CM

## 2019-04-19 DIAGNOSIS — R42 DIZZINESS: ICD-10-CM

## 2019-04-19 DIAGNOSIS — E06.3 HASHIMOTO'S DISEASE: ICD-10-CM

## 2019-04-19 PROCEDURE — 99214 OFFICE O/P EST MOD 30 MIN: CPT | Performed by: INTERNAL MEDICINE

## 2019-04-19 RX ORDER — LEVOTHYROXINE SODIUM 125 MCG
62.5 TABLET ORAL DAILY
Qty: 45 TABLET | Refills: 1 | Status: SHIPPED | OUTPATIENT
Start: 2019-04-19 | End: 2019-09-26 | Stop reason: SDUPTHER

## 2019-04-19 NOTE — PROGRESS NOTES
53 y.o.    Patient Care Team:  Felipe Cedeno MD as PCP - General (Family Medicine)    Chief Complaint:        F/U HYPOTHYROIDISM.  HERE TO DISCUSS LAB RESULTS.     Subjective     HPI   Patient is a 53-year-old white female with a past history of hypothyroidism and Hashimoto's thyroiditis came for follow-up    Hypothyroidism  Patient is currently taking Synthroid 62.5 mcg daily.  She reports that she had been compliant with the medication  She takes it on empty stomach in the morning  She is taking the brand name Synthroid and energy levels are improving  Gastroesophageal reflux disease  Patient takes Nexium in the evening  Her dizziness has improved somewhat  Vitamin D deficiency patient is not taking vitamin D on a regular basis  Chronic fatigue  Patient reports that she slightly better after taking Synthroid  Iatrogenic hyperthyroidism  Hyperlipidemia  Patient is currently not on any medication    Interval History    Patient is a 53-year-old white female with a past history of hypothyroidism for more than 20 years came for a new patient consultation      reports that she was diagnosed with hypothyroidism when she was living in [place] in 1999 and has been on medication ever since  She has been on 75 µg Synthroid for a very long time  She was not able to tolerate the levothyroxine generic hence she was on brand name Synthroid     Patient takes the Synthroid early morning on an empty stomach  She does not eat or drink for at least 2 hours if not longer  She is taking all her medication later in the day  Chronic fatigue  Patient has had fatigue for a very long time but in the past few months her fatigue is getting worse  She used to stay up late until 1:00 or 2:00 AM but now she feels extremely sleepy and tired by 9 PM and goes to bed  Insomnia chronic  Patient reports that despite chronic insomnia her sleep pattern has changed over the past few months and she is going to bed early and waking up late  She  has no energy after long night sleep  Patient is also been reporting increasing sweating, shaking, occasional chest pains and chest pressure, stomach rumbling constantly and increasing hunger at times and significant hair loss.  She denied any specific instances of palpitations and she has been drinking more caffeine to stay up alert during the day     Patient is unaware of Hashimoto's thyroiditis but has a strong family history of thyroid problems on both sides of the family  She is also been expressing chronic headaches     Hyperlipidemia  Patient used to be on statins including Crestor.  Apparently she had significant problems with the statin medication.  She started utilizing red yeast Rice extract over the past couple years  She reports that her lipid profile is improved significantly.  She recently started experiencing lower abdominal symptoms stomach rumbling and achiness and discontinued this extract a few months ago and feels only slightly better now           The following portions of the patient's history were reviewed and updated as appropriate: allergies, current medications, past family history, past medical history, past social history, past surgical history and problem list.    Past Medical History:   Diagnosis Date   • Allergic    • Chronic headaches    • Fibrocystic breast    • Hyperlipidemia    • Hypothyroidism      Family History   Problem Relation Age of Onset   • Heart attack Father    • Cancer Maternal Aunt         breast and pancreatic cancer    • Breast cancer Maternal Aunt    • Pancreatic cancer Maternal Aunt    • Cancer Maternal Grandmother         pancreatic cancer     • Kidney failure Maternal Grandmother    • Cancer Paternal Grandmother         brain tumor      Social History     Socioeconomic History   • Marital status:      Spouse name: Not on file   • Number of children: Not on file   • Years of education: Not on file   • Highest education level: Not on file   Tobacco Use   •  "Smoking status: Never Smoker   • Smokeless tobacco: Never Used   Substance and Sexual Activity   • Alcohol use: Yes     Comment: Rare   • Drug use: No   • Sexual activity: Defer     Allergies   Allergen Reactions   • Nexium [Esomeprazole Magnesium] Other (See Comments) and Dizziness     Weakness     • Penicillins Rash       Current Outpatient Medications:   •  azelastine (ASTELIN) 0.1 % nasal spray, SPRAY ONCE IN EACH NOSTRIL TWICE DAILY, Disp: 90 mL, Rfl: 0  •  Butalbital-APAP-Caffeine -40 MG per capsule, Take 1 capsule by mouth Every 4 (Four) Hours As Needed for headaches., Disp: , Rfl:   •  famotidine (PEPCID) 20 MG tablet, Take 1 tablet by mouth 2 (Two) Times a Day., Disp: 60 tablet, Rfl: 2  •  levocetirizine (XYZAL) 5 MG tablet, Take 1 tablet by mouth Daily., Disp: 90 tablet, Rfl: 3  •  SYNTHROID 75 MCG tablet, Take 1 tablet by mouth Daily., Disp: 90 tablet, Rfl: 0        Review of Systems   Constitutional: Negative for chills and fatigue.   Cardiovascular: Negative for chest pain and palpitations.   Gastrointestinal: Negative for abdominal pain, constipation, diarrhea, nausea and vomiting.   Endocrine: Positive for heat intolerance. Negative for cold intolerance.   All other systems reviewed and are negative.      Objective       Vitals:    04/19/19 1102   BP: 94/60   Pulse: 74   Weight: 60.9 kg (134 lb 3.2 oz)   Height: 152.4 cm (60\")     Body mass index is 26.21 kg/m².      Physical Exam   Constitutional: She is oriented to person, place, and time. She appears well-developed and well-nourished.   HENT:   Head: Normocephalic and atraumatic.   Eyes: EOM are normal. Pupils are equal, round, and reactive to light.   Neck: Normal range of motion. Neck supple. No thyromegaly present.   Cardiovascular: Normal rate, regular rhythm, normal heart sounds and intact distal pulses.   Pulmonary/Chest: Effort normal and breath sounds normal.   Abdominal: Soft. Bowel sounds are normal. She exhibits no distension. "   Musculoskeletal: Normal range of motion. She exhibits no edema.   Neurological: She is alert and oriented to person, place, and time. She displays normal reflexes.   Skin: Skin is warm and dry.   Psychiatric: She has a normal mood and affect. Her behavior is normal.   Nursing note and vitals reviewed.    Results Review:     I reviewed the patient's new clinical results.    Medical records reviewed  Summary:      Lab on 04/15/2019   Component Date Value Ref Range Status   • Free T4 04/15/2019 1.42  0.93 - 1.70 ng/dL Final   • TSH 04/15/2019 0.116* 0.270 - 4.200 mIU/mL Final     No results found for: HGBA1C  Lab Results   Component Value Date    CREATININE 0.77 01/08/2019     Imaging Results (most recent)     None                Assessment and Plan:    Zi was seen today for hypothyroidism.    Diagnoses and all orders for this visit:    Adult hypothyroidism  -     T4, Free; Future  -     TSH; Future    Hashimoto's disease  -     T4, Free; Future  -     TSH; Future    Chronic fatigue    Dizziness    Hyperlipidemia, unspecified hyperlipidemia type    Other orders  -     SYNTHROID 125 MCG tablet; Take 0.5 tablets by mouth Daily.       Patient is currently taking Synthroid 75 mcg daily.  She reports compliance with medication  She denies any symptoms suggestive of hyper or hypothyroidism    Lab reports reviewed  Patient's TSH is 0.1  I think a TSH should be kept in the normal range  I will decrease the dose to 62.5 mcg of Synthroid daily  Patient verbalized understanding    Prescription was sent to pharmacy  Patient return to follow-up in 6 months.    The total time spent  was more than 25 min of which greater than 15 min of time (greater than 50% of the total time)  was spent face to face with the patient counseling and coordination of care on recommended evaluation and treatment options, instructions for management/treatment and /or follow up and importance of compliance with chosen management or treatment  "options    George Love MD. FACE    04/19/19      EMR Dragon / transcription disclaimer:     \"Dictated utilizing Dragon dictation\".         "

## 2019-05-23 DIAGNOSIS — E03.9 ADULT HYPOTHYROIDISM: Primary | ICD-10-CM

## 2019-05-24 ENCOUNTER — TELEPHONE (OUTPATIENT)
Dept: ENDOCRINOLOGY | Age: 54
End: 2019-05-24

## 2019-05-24 NOTE — TELEPHONE ENCOUNTER
I called patient and scheduled lab appt 5-28-19, 10:30am on 5-24-19, 4:15pm. VL     ----- Message from Liudmila Montoya MA sent at 5/23/2019  3:57 PM EDT -----  Contact: patient  PATIENT CAN COME FOR LABS, SHE WILL NEED LAB APPOINTMENT. LABS ORDERED  ----- Message -----  From: Mariah Huddleston  Sent: 5/23/2019  10:04 AM  To: Liudmila Montoya MA    Patient asked if she can have labs drawn next week due to not feeling well. She said  She is very tired, shaky, having a hard time sleeping and having chest pressure. When I asked her if she feels that she needs to go to the ER she said No that it is the same as when her thyroid was off.   She said she feels she needs an adjustment.   Pt - [phone]

## 2019-05-28 ENCOUNTER — LAB (OUTPATIENT)
Dept: ENDOCRINOLOGY | Age: 54
End: 2019-05-28

## 2019-05-28 DIAGNOSIS — E06.3 HASHIMOTO'S DISEASE: ICD-10-CM

## 2019-05-28 DIAGNOSIS — E03.9 ADULT HYPOTHYROIDISM: ICD-10-CM

## 2019-05-28 LAB
T4 FREE SERPL-MCNC: 1.12 NG/DL (ref 0.93–1.7)
TSH SERPL DL<=0.005 MIU/L-ACNC: 1.15 MIU/ML (ref 0.27–4.2)

## 2019-05-29 ENCOUNTER — RESULTS ENCOUNTER (OUTPATIENT)
Dept: ENDOCRINOLOGY | Age: 54
End: 2019-05-29

## 2019-05-29 DIAGNOSIS — E03.9 ADULT HYPOTHYROIDISM: ICD-10-CM

## 2019-05-31 ENCOUNTER — TELEPHONE (OUTPATIENT)
Dept: FAMILY MEDICINE CLINIC | Facility: CLINIC | Age: 54
End: 2019-05-31

## 2019-05-31 DIAGNOSIS — R42 VERTIGO: Primary | ICD-10-CM

## 2019-07-08 ENCOUNTER — RESULTS ENCOUNTER (OUTPATIENT)
Dept: ENDOCRINOLOGY | Age: 54
End: 2019-07-08

## 2019-07-08 DIAGNOSIS — E06.3 HASHIMOTO'S DISEASE: ICD-10-CM

## 2019-07-08 DIAGNOSIS — E03.9 ADULT HYPOTHYROIDISM: ICD-10-CM

## 2019-07-08 DIAGNOSIS — E03.9 ADULT HYPOTHYROIDISM: Primary | ICD-10-CM

## 2019-07-08 LAB
T4 FREE SERPL-MCNC: 1.01 NG/DL (ref 0.93–1.7)
TSH SERPL DL<=0.005 MIU/L-ACNC: 2.45 MIU/ML (ref 0.27–4.2)

## 2019-08-01 ENCOUNTER — TELEPHONE (OUTPATIENT)
Dept: ENDOCRINOLOGY | Age: 54
End: 2019-08-01

## 2019-08-01 NOTE — TELEPHONE ENCOUNTER
----- Message from Mariah Huddleston sent at 8/1/2019  1:14 PM EDT -----  Contact: Orville -    Orville asked if Dr. Gupta could see patient sooner than 8-13-19.  I rescheduled patient to tomorrow 8-2-19, 9:00am. He said patient went to the grocery today and could not even bring the groceries in. He said she is in bed and said he thinks her thyroid is out of control. He mentioned the email that patient sent Dr. Gupta in [employer]. and I read it to him and your note in it.     He said patient has gained 8 lbs in 2 weeks.  Orville said patients # [phone]    CHARTED

## 2019-08-02 ENCOUNTER — OFFICE VISIT (OUTPATIENT)
Dept: ENDOCRINOLOGY | Age: 54
End: 2019-08-02

## 2019-08-02 VITALS
BODY MASS INDEX: 26.58 KG/M2 | DIASTOLIC BLOOD PRESSURE: 60 MMHG | SYSTOLIC BLOOD PRESSURE: 90 MMHG | WEIGHT: 135.4 LBS | HEART RATE: 73 BPM | HEIGHT: 60 IN

## 2019-08-02 DIAGNOSIS — E03.9 ADULT HYPOTHYROIDISM: Primary | ICD-10-CM

## 2019-08-02 DIAGNOSIS — E05.80 IATROGENIC HYPERTHYROIDISM: ICD-10-CM

## 2019-08-02 DIAGNOSIS — R53.82 CHRONIC FATIGUE: ICD-10-CM

## 2019-08-02 DIAGNOSIS — E78.5 HYPERLIPIDEMIA, UNSPECIFIED HYPERLIPIDEMIA TYPE: ICD-10-CM

## 2019-08-02 DIAGNOSIS — E06.3 HASHIMOTO'S DISEASE: ICD-10-CM

## 2019-08-02 LAB
T3 SERPL-MCNC: 78.1 NG/DL (ref 80–200)
T4 FREE SERPL-MCNC: 1.08 NG/DL (ref 0.93–1.7)
TSH SERPL DL<=0.005 MIU/L-ACNC: 7.76 MIU/ML (ref 0.27–4.2)

## 2019-08-02 PROCEDURE — 99214 OFFICE O/P EST MOD 30 MIN: CPT | Performed by: INTERNAL MEDICINE

## 2019-08-02 NOTE — PROGRESS NOTES
54 y.o.    Patient Care Team:  Felipe Cedeno MD as PCP - General (Family Medicine)    Chief Complaint:      F/U HYPOTHYROIDISM.    Subjective     HPI   Patient is a 54-year-old white female with a history of hypothyroidism and Hashimoto's thyroiditis came for follow-up    Hypothyroidism  Patient is currently taking Synthroid 62.5 mcg daily.  She takes the brand-name medication  She takes it on empty stomach early morning  She waits at least 1 to 2 hours before eating or drinking or taking other medication  She initially reported significant improvement on switching to brand name Synthroid  Her dose was reduced from 75-to 62.5 mcg due to symptoms  Gastroesophageal reflux disease  Patient reported that she stopped taking all medication and her symptoms are worsening  She is only taking Gaviscon at this time  Vitamin D deficiency  Patient reports that she is taking vitamin D on a regular basis currently  Chronic fatigue  After reporting some initial improvement she is currently reporting worsening of fatigue and is not able to do any work at home and is very concerned   is very concerned about her lack of energy as well  Hyperlipidemia  Patient is currently not on any specific medication      Interval History    Patient is a 53-year-old white female with a past history of hypothyroidism for more than 20 years came for a new patient consultation      reports that she was diagnosed with hypothyroidism when she was living in [place] in 1999 and has been on medication ever since  She has been on 75 µg Synthroid for a very long time  She was not able to tolerate the levothyroxine generic hence she was on brand name Synthroid     Patient takes the Synthroid early morning on an empty stomach  She does not eat or drink for at least 2 hours if not longer  She is taking all her medication later in the day  Chronic fatigue  Patient has had fatigue for a very long time but in the past few months her fatigue is  getting worse  She used to stay up late until 1:00 or 2:00 AM but now she feels extremely sleepy and tired by 9 PM and goes to bed  Insomnia chronic  Patient reports that despite chronic insomnia her sleep pattern has changed over the past few months and she is going to bed early and waking up late  She has no energy after long night sleep  Patient is also been reporting increasing sweating, shaking, occasional chest pains and chest pressure, stomach rumbling constantly and increasing hunger at times and significant hair loss.  She denied any specific instances of palpitations and she has been drinking more caffeine to stay up alert during the day     Patient is unaware of Hashimoto's thyroiditis but has a strong family history of thyroid problems on both sides of the family  She is also been expressing chronic headaches     Hyperlipidemia  Patient used to be on statins including Crestor.  Apparently she had significant problems with the statin medication.  She started utilizing red yeast Rice extract over the past couple years  She reports that her lipid profile is improved significantly.  She recently started experiencing lower abdominal symptoms stomach rumbling and achiness and discontinued this extract a few months ago and feels only slightly better now        The following portions of the patient's history were reviewed and updated as appropriate: allergies, current medications, past family history, past medical history, past social history, past surgical history and problem list.    Past Medical History:   Diagnosis Date   • Allergic    • Chronic headaches    • Fibrocystic breast    • Hyperlipidemia    • Hypothyroidism      Family History   Problem Relation Age of Onset   • Heart attack Father    • Cancer Maternal Aunt         breast and pancreatic cancer    • Breast cancer Maternal Aunt    • Pancreatic cancer Maternal Aunt    • Cancer Maternal Grandmother         pancreatic cancer     • Kidney failure  "Maternal Grandmother    • Cancer Paternal Grandmother         brain tumor      Social History     Socioeconomic History   • Marital status:      Spouse name: Not on file   • Number of children: Not on file   • Years of education: Not on file   • Highest education level: Not on file   Tobacco Use   • Smoking status: Never Smoker   • Smokeless tobacco: Never Used   Substance and Sexual Activity   • Alcohol use: Yes     Comment: Rare   • Drug use: No   • Sexual activity: Defer     Allergies   Allergen Reactions   • Nexium [Esomeprazole Magnesium] Other (See Comments) and Dizziness     Weakness     • Penicillins Rash       Current Outpatient Medications:   •  azelastine (ASTELIN) 0.1 % nasal spray, SPRAY ONCE IN EACH NOSTRIL TWICE DAILY, Disp: 90 mL, Rfl: 0  •  Butalbital-APAP-Caffeine -40 MG per capsule, Take 1 capsule by mouth Every 4 (Four) Hours As Needed for headaches., Disp: , Rfl:   •  famotidine (PEPCID) 20 MG tablet, Take 1 tablet by mouth 2 (Two) Times a Day., Disp: 60 tablet, Rfl: 2  •  fluocinonide (LIDEX) 0.05 % cream, , Disp: , Rfl:   •  levocetirizine (XYZAL) 5 MG tablet, Take 1 tablet by mouth Daily., Disp: 90 tablet, Rfl: 3  •  SYNTHROID 125 MCG tablet, Take 0.5 tablets by mouth Daily., Disp: 45 tablet, Rfl: 1        Review of Systems   Constitutional: Positive for fatigue. Negative for chills and fever.   Cardiovascular: Positive for palpitations. Negative for chest pain.   Gastrointestinal: Positive for diarrhea. Negative for abdominal pain, constipation, nausea and vomiting.   Endocrine: Negative for cold intolerance and heat intolerance.   All other systems reviewed and are negative.      Objective       Vitals:    08/02/19 0922   BP: 90/60   Pulse: 73   Weight: 61.4 kg (135 lb 6.4 oz)   Height: 152.4 cm (60\")     Body mass index is 26.44 kg/m².      Physical Exam   Constitutional: She is oriented to person, place, and time. She appears well-developed and well-nourished.   HENT:   Head: " Normocephalic and atraumatic.   Eyes: EOM are normal. Pupils are equal, round, and reactive to light.   Neck: Normal range of motion. Neck supple. No thyromegaly present.   Cardiovascular: Normal rate, regular rhythm, normal heart sounds and intact distal pulses.   Pulmonary/Chest: Effort normal and breath sounds normal.   Abdominal: Soft. Bowel sounds are normal. She exhibits no distension.   Musculoskeletal: Normal range of motion. She exhibits no edema.   Neurological: She is alert and oriented to person, place, and time. She displays normal reflexes.   Skin: Skin is warm and dry.   Psychiatric: She has a normal mood and affect. Her behavior is normal.   Nursing note and vitals reviewed.    Results Review:     I reviewed the patient's new clinical results.    Medical records reviewed  Summary:      Orders Only on 07/08/2019   Component Date Value Ref Range Status   • Free T4 07/08/2019 1.01  0.93 - 1.70 ng/dL Final   • TSH 07/08/2019 2.450  0.270 - 4.200 mIU/mL Final     No results found for: HGBA1C  Lab Results   Component Value Date    CREATININE 0.77 01/08/2019     Imaging Results (most recent)     None                Assessment and Plan:    Zi was seen today for hypothyroidism.    Diagnoses and all orders for this visit:    Adult hypothyroidism  -     T4, Free  -     T3  -     TSH  -     T4, Free; Future  -     TSH; Future    Hashimoto's disease    Chronic fatigue    Iatrogenic hyperthyroidism    Hyperlipidemia, unspecified hyperlipidemia type      Patient is currently taking Synthroid brand name 62.5 mcg daily.  She reports compliance with the medication  She denies any hyper or hypothyroid symptoms  She reports worsening fatigue for the past several months  She believes that her medication needs to be changed again    Lab reports reviewed  Patient's TSH is 2.45 and the free T4 is 1.0  Patient is reporting worsening symptoms of fatigue and lethargy  She is not able to clearly state that she felt better  "when the TSH was 1.0 but I would assume that she may feel better with a TSH of 0.8-1.0  I will recommend increasing the dose of 75 mcg Synthroid daily  I discussed with the patient and her  that even subclinical hyperthyroid state is known to increase the risk of atrial fibrillation which patient apparently had in the past    With patient and  verbalized understanding but are willing to try the 75 mcg dose once again prescription was given to the patient  Patient return to follow-up in 6 months.    The total time spent  was more than 25 min of which greater than 15 min of time (greater than 50% of the total time)  was spent face to face with the patient counseling and coordination of care on recommended evaluation and treatment options, instructions for management/treatment and /or follow up and importance of compliance with chosen management or treatment options     George Love MD. FACE    08/02/19      EMR Dragon / transcription disclaimer:     \"Dictated utilizing Dragon dictation\".         "

## 2019-08-06 ENCOUNTER — TELEPHONE (OUTPATIENT)
Dept: ENDOCRINOLOGY | Age: 54
End: 2019-08-06

## 2019-08-06 NOTE — TELEPHONE ENCOUNTER
----- Message from Lorenza Berrios sent at 8/6/2019  9:56 AM EDT -----  Pt called and said she's been trying to get in touch with  threw my chart and no one has responded to her. If someone can get in touch with her about lab results.      [phone]       RESPONDED VIA [employer]

## 2019-08-07 ENCOUNTER — TELEPHONE (OUTPATIENT)
Dept: FAMILY MEDICINE CLINIC | Facility: CLINIC | Age: 54
End: 2019-08-07

## 2019-08-07 ENCOUNTER — RESULTS ENCOUNTER (OUTPATIENT)
Dept: ENDOCRINOLOGY | Age: 54
End: 2019-08-07

## 2019-08-07 DIAGNOSIS — E03.9 ADULT HYPOTHYROIDISM: ICD-10-CM

## 2019-08-07 NOTE — TELEPHONE ENCOUNTER
Pt's Endocrinologist (MD Ivan) recommending pt make an appointment with pcp regarding underactive thyroid. (thyroid results are in chart). Is it possible to review thyroid results and should pt be seen sooner? next available appointment isn't tell 8/22.

## 2019-08-15 ENCOUNTER — OFFICE VISIT (OUTPATIENT)
Dept: FAMILY MEDICINE CLINIC | Facility: CLINIC | Age: 54
End: 2019-08-15

## 2019-08-15 VITALS
WEIGHT: 136.3 LBS | HEART RATE: 74 BPM | OXYGEN SATURATION: 98 % | SYSTOLIC BLOOD PRESSURE: 118 MMHG | RESPIRATION RATE: 14 BRPM | HEIGHT: 60 IN | TEMPERATURE: 98.3 F | DIASTOLIC BLOOD PRESSURE: 58 MMHG | BODY MASS INDEX: 26.76 KG/M2

## 2019-08-15 DIAGNOSIS — E06.3 HASHIMOTO'S DISEASE: ICD-10-CM

## 2019-08-15 DIAGNOSIS — E78.5 HYPERLIPIDEMIA, UNSPECIFIED HYPERLIPIDEMIA TYPE: Primary | ICD-10-CM

## 2019-08-15 DIAGNOSIS — E55.9 VITAMIN D DEFICIENCY: ICD-10-CM

## 2019-08-15 DIAGNOSIS — R53.82 CHRONIC FATIGUE: ICD-10-CM

## 2019-08-15 DIAGNOSIS — E03.9 ADULT HYPOTHYROIDISM: ICD-10-CM

## 2019-08-15 PROCEDURE — 99214 OFFICE O/P EST MOD 30 MIN: CPT | Performed by: FAMILY MEDICINE

## 2019-08-15 NOTE — PROGRESS NOTES
Zi Jasso is a 54 y.o. female.     Chief Complaint   Patient presents with   • Thyroid Problem     patient is following up on abnormal lab results        HPI     Patient presents the office today to follow-up on history hyperlipidemia, hypothyroidism, Hashimoto's, chronic fatigue, and vitamin D deficiency.  Patient has been seeing her endocrinologist and is currently taking 75 mcg of Synthroid.  States that it was not initially helping but over the last couple of days she is found improvement.  Trying to maintain healthy diet and exercise regiment.  This is been somewhat difficult due to her extreme fatigue.    The following portions of the patient's history were reviewed and updated as appropriate: allergies, current medications, past family history, past medical history, past social history, past surgical history and problem list.    Review of Systems   Constitutional: Positive for fatigue.   All other systems reviewed and are negative.    Reviewed and agree with documentation.    Objective  Vitals:    08/15/19 1347   BP: 118/58   Pulse: 74   Resp: 14   Temp: 98.3 °F (36.8 °C)   SpO2: 98%       Physical Exam   Constitutional: She is oriented to person, place, and time. She appears well-developed and well-nourished. No distress.   HENT:   Head: Normocephalic and atraumatic.   Right Ear: External ear normal.   Left Ear: External ear normal.   Nose: Nose normal.   Mouth/Throat: Oropharynx is clear and moist.   Eyes: Conjunctivae and EOM are normal. Pupils are equal, round, and reactive to light. Right eye exhibits no discharge. Left eye exhibits no discharge. No scleral icterus.   Cardiovascular: Normal rate, regular rhythm and normal heart sounds.   Pulmonary/Chest: Effort normal and breath sounds normal. No respiratory distress. She has no wheezes. She has no rales.   Abdominal: Soft. Bowel sounds are normal. She exhibits no distension. There is no tenderness.   Neurological: She is alert and oriented to  person, place, and time.   Skin: Skin is warm and dry. She is not diaphoretic.   Nursing note and vitals reviewed.        Current Outpatient Medications:   •  Alum & Mag Hydroxide-Simeth (ANTACID ADVANCED PO), Take  by mouth., Disp: , Rfl:   •  azelastine (ASTELIN) 0.1 % nasal spray, SPRAY ONCE IN EACH NOSTRIL TWICE DAILY, Disp: 90 mL, Rfl: 0  •  Butalbital-APAP-Caffeine -40 MG per capsule, Take 1 capsule by mouth Every 4 (Four) Hours As Needed for headaches., Disp: , Rfl:   •  Cholecalciferol (VITAMIN D3) 5000 units capsule capsule, Take 5,000 Units by mouth Daily., Disp: , Rfl:   •  levocetirizine (XYZAL) 5 MG tablet, Take 1 tablet by mouth Daily., Disp: 90 tablet, Rfl: 3  •  SYNTHROID 125 MCG tablet, Take 0.5 tablets by mouth Daily. (Patient taking differently: Take 0.075 mcg by mouth Daily.), Disp: 45 tablet, Rfl: 1  Current outpatient and discharge medications have been reconciled for the patient.  Reviewed by: Felipe Cedeno MD      Procedures    Lab Results (most recent)     None                  Zi was seen today for thyroid problem.    Diagnoses and all orders for this visit:    Hyperlipidemia, unspecified hyperlipidemia type  -     Comprehensive Metabolic Panel  -     Lipid Panel  -     Hemoglobin A1c  -     Thyroid Panel With TSH  -     Vitamin B12  -     Vitamin D 25 Hydroxy  -     CBC Auto Differential    Adult hypothyroidism  -     Comprehensive Metabolic Panel  -     Lipid Panel  -     Hemoglobin A1c  -     Thyroid Panel With TSH  -     Vitamin B12  -     Vitamin D 25 Hydroxy  -     CBC Auto Differential    Hashimoto's disease  -     Comprehensive Metabolic Panel  -     Lipid Panel  -     Hemoglobin A1c  -     Thyroid Panel With TSH  -     Vitamin B12  -     Vitamin D 25 Hydroxy  -     CBC Auto Differential    Chronic fatigue  -     Comprehensive Metabolic Panel  -     Lipid Panel  -     Hemoglobin A1c  -     Thyroid Panel With TSH  -     Vitamin B12  -     Vitamin D 25 Hydroxy  -     CBC Auto  Differential    Vitamin D deficiency  -     Comprehensive Metabolic Panel  -     Lipid Panel  -     Hemoglobin A1c  -     Thyroid Panel With TSH  -     Vitamin B12  -     Vitamin D 25 Hydroxy  -     CBC Auto Differential      Will get labs as above to evaluate the status of these chronic medical problems and adjust treatment plan accordingly.  Advised continued follow-up with specialist.    Return for As needed.      Felipe Cedeno MD

## 2019-08-29 LAB
25(OH)D3+25(OH)D2 SERPL-MCNC: 41.2 NG/ML (ref 30–100)
ALBUMIN SERPL-MCNC: 4.2 G/DL (ref 3.5–5.2)
ALBUMIN/GLOB SERPL: 1.8 G/DL
ALP SERPL-CCNC: 89 U/L (ref 39–117)
ALT SERPL-CCNC: 27 U/L (ref 1–33)
AST SERPL-CCNC: 28 U/L (ref 1–32)
BASOPHILS # BLD AUTO: 0.03 10*3/MM3 (ref 0–0.2)
BASOPHILS NFR BLD AUTO: 0.8 % (ref 0–1.5)
BILIRUB SERPL-MCNC: 1 MG/DL (ref 0.2–1.2)
BUN SERPL-MCNC: 8 MG/DL (ref 6–20)
BUN/CREAT SERPL: 9.3 (ref 7–25)
CALCIUM SERPL-MCNC: 9.5 MG/DL (ref 8.6–10.5)
CHLORIDE SERPL-SCNC: 103 MMOL/L (ref 98–107)
CHOLEST SERPL-MCNC: 258 MG/DL (ref 0–200)
CO2 SERPL-SCNC: 25.5 MMOL/L (ref 22–29)
CREAT SERPL-MCNC: 0.86 MG/DL (ref 0.57–1)
EOSINOPHIL # BLD AUTO: 0.17 10*3/MM3 (ref 0–0.4)
EOSINOPHIL NFR BLD AUTO: 4.4 % (ref 0.3–6.2)
ERYTHROCYTE [DISTWIDTH] IN BLOOD BY AUTOMATED COUNT: 13.2 % (ref 12.3–15.4)
FT4I SERPL CALC-MCNC: 2.8 (ref 1.2–4.9)
GLOBULIN SER CALC-MCNC: 2.4 GM/DL
GLUCOSE SERPL-MCNC: 96 MG/DL (ref 65–99)
HBA1C MFR BLD: 5.3 % (ref 4.8–5.6)
HCT VFR BLD AUTO: 43.9 % (ref 34–46.6)
HDLC SERPL-MCNC: 61 MG/DL (ref 40–60)
HGB BLD-MCNC: 14 G/DL (ref 12–15.9)
IMM GRANULOCYTES # BLD AUTO: 0.02 10*3/MM3 (ref 0–0.05)
IMM GRANULOCYTES NFR BLD AUTO: 0.5 % (ref 0–0.5)
LDLC SERPL CALC-MCNC: 180 MG/DL (ref 0–100)
LYMPHOCYTES # BLD AUTO: 1.21 10*3/MM3 (ref 0.7–3.1)
LYMPHOCYTES NFR BLD AUTO: 31.1 % (ref 19.6–45.3)
MCH RBC QN AUTO: 29 PG (ref 26.6–33)
MCHC RBC AUTO-ENTMCNC: 31.9 G/DL (ref 31.5–35.7)
MCV RBC AUTO: 91.1 FL (ref 79–97)
MONOCYTES # BLD AUTO: 0.48 10*3/MM3 (ref 0.1–0.9)
MONOCYTES NFR BLD AUTO: 12.3 % (ref 5–12)
NEUTROPHILS # BLD AUTO: 1.98 10*3/MM3 (ref 1.7–7)
NEUTROPHILS NFR BLD AUTO: 50.9 % (ref 42.7–76)
NRBC BLD AUTO-RTO: 0 /100 WBC (ref 0–0.2)
PLATELET # BLD AUTO: 220 10*3/MM3 (ref 140–450)
POTASSIUM SERPL-SCNC: 4.1 MMOL/L (ref 3.5–5.2)
PROT SERPL-MCNC: 6.6 G/DL (ref 6–8.5)
RBC # BLD AUTO: 4.82 10*6/MM3 (ref 3.77–5.28)
SODIUM SERPL-SCNC: 141 MMOL/L (ref 136–145)
T3RU NFR SERPL: 32 % (ref 24–39)
T4 SERPL-MCNC: 8.7 UG/DL (ref 4.5–12)
TRIGL SERPL-MCNC: 84 MG/DL (ref 0–150)
TSH SERPL DL<=0.005 MIU/L-ACNC: 0.81 UIU/ML (ref 0.45–4.5)
VIT B12 SERPL-MCNC: 455 PG/ML (ref 211–946)
VLDLC SERPL CALC-MCNC: 16.8 MG/DL
WBC # BLD AUTO: 3.89 10*3/MM3 (ref 3.4–10.8)

## 2019-09-02 ENCOUNTER — DOCUMENTATION (OUTPATIENT)
Dept: FAMILY MEDICINE CLINIC | Facility: CLINIC | Age: 54
End: 2019-09-02

## 2019-09-02 RX ORDER — NITROFURANTOIN 25; 75 MG/1; MG/1
100 CAPSULE ORAL 2 TIMES DAILY
Qty: 14 CAPSULE | Refills: 0 | Status: SHIPPED | OUTPATIENT
Start: 2019-09-02 | End: 2019-09-09

## 2019-09-18 ENCOUNTER — LAB (OUTPATIENT)
Dept: ENDOCRINOLOGY | Age: 54
End: 2019-09-18

## 2019-09-18 DIAGNOSIS — E03.9 ADULT HYPOTHYROIDISM: ICD-10-CM

## 2019-09-18 DIAGNOSIS — E03.9 ADULT HYPOTHYROIDISM: Primary | ICD-10-CM

## 2019-09-18 LAB
ALBUMIN SERPL-MCNC: 4.3 G/DL (ref 3.5–5.2)
ALBUMIN/GLOB SERPL: 1.8 G/DL
ALP SERPL-CCNC: 95 U/L (ref 39–117)
ALT SERPL-CCNC: 40 U/L (ref 1–33)
AST SERPL-CCNC: 37 U/L (ref 1–32)
BILIRUB SERPL-MCNC: 1.2 MG/DL (ref 0.2–1.2)
BUN SERPL-MCNC: 9 MG/DL (ref 6–20)
BUN/CREAT SERPL: 11.7 (ref 7–25)
CALCIUM SERPL-MCNC: 9.5 MG/DL (ref 8.6–10.5)
CHLORIDE SERPL-SCNC: 102 MMOL/L (ref 98–107)
CO2 SERPL-SCNC: 25.9 MMOL/L (ref 22–29)
CREAT SERPL-MCNC: 0.77 MG/DL (ref 0.57–1)
GLOBULIN SER CALC-MCNC: 2.4 GM/DL
GLUCOSE SERPL-MCNC: 92 MG/DL (ref 65–99)
POTASSIUM SERPL-SCNC: 4.2 MMOL/L (ref 3.5–5.2)
PROT SERPL-MCNC: 6.7 G/DL (ref 6–8.5)
SODIUM SERPL-SCNC: 140 MMOL/L (ref 136–145)
T3 SERPL-MCNC: 102 NG/DL (ref 80–200)
T4 FREE SERPL-MCNC: 1.51 NG/DL (ref 0.93–1.7)
TSH SERPL DL<=0.005 MIU/L-ACNC: 0.56 UIU/ML (ref 0.27–4.2)

## 2019-09-20 ENCOUNTER — RESULTS ENCOUNTER (OUTPATIENT)
Dept: ENDOCRINOLOGY | Age: 54
End: 2019-09-20

## 2019-09-20 DIAGNOSIS — E03.9 ADULT HYPOTHYROIDISM: ICD-10-CM

## 2019-09-23 ENCOUNTER — TELEPHONE (OUTPATIENT)
Dept: ENDOCRINOLOGY | Age: 54
End: 2019-09-23

## 2019-09-25 ENCOUNTER — TELEPHONE (OUTPATIENT)
Dept: ENDOCRINOLOGY | Age: 54
End: 2019-09-25

## 2019-09-25 NOTE — TELEPHONE ENCOUNTER
PT NEEDS REFILL ON SYNTHROID .075  PT FLYING OUT OF TOWN ON NEXT Tuesday  NEEDS TO KNOW WHERE TO GO FROM HERE ON THIS MEDICATION     PT PH [phone]

## 2019-09-26 RX ORDER — LEVOTHYROXINE SODIUM 75 MCG
75 TABLET ORAL DAILY
Qty: 90 TABLET | Refills: 0 | OUTPATIENT
Start: 2019-09-26 | End: 2020-09-25

## 2019-09-26 RX ORDER — LEVOTHYROXINE SODIUM 125 MCG
62.5 TABLET ORAL DAILY
Qty: 45 TABLET | Refills: 1 | Status: SHIPPED | OUTPATIENT
Start: 2019-09-26 | End: 2019-11-15 | Stop reason: SDUPTHER

## 2019-10-02 NOTE — TELEPHONE ENCOUNTER
I called patient and asked her if she wants me to schedule lab appt in 6 weeks and cancel her lab appt on 10-14-19. She said she wants to look at her calendar and call our office back tomorrow.

## 2019-10-15 DIAGNOSIS — E06.3 HASHIMOTO'S DISEASE: ICD-10-CM

## 2019-10-15 DIAGNOSIS — E03.9 ADULT HYPOTHYROIDISM: Primary | ICD-10-CM

## 2019-10-23 ENCOUNTER — RESULTS ENCOUNTER (OUTPATIENT)
Dept: ENDOCRINOLOGY | Age: 54
End: 2019-10-23

## 2019-10-23 DIAGNOSIS — E03.9 ADULT HYPOTHYROIDISM: ICD-10-CM

## 2019-10-23 DIAGNOSIS — E06.3 HASHIMOTO'S DISEASE: ICD-10-CM

## 2019-10-23 LAB
ALBUMIN SERPL-MCNC: 4.3 G/DL (ref 3.5–5.2)
ALBUMIN/GLOB SERPL: 1.8 G/DL
ALP SERPL-CCNC: 84 U/L (ref 39–117)
ALT SERPL-CCNC: 33 U/L (ref 1–33)
AST SERPL-CCNC: 30 U/L (ref 1–32)
BILIRUB SERPL-MCNC: 0.8 MG/DL (ref 0.2–1.2)
BUN SERPL-MCNC: 8 MG/DL (ref 6–20)
BUN/CREAT SERPL: 9.9 (ref 7–25)
CALCIUM SERPL-MCNC: 9.6 MG/DL (ref 8.6–10.5)
CHLORIDE SERPL-SCNC: 103 MMOL/L (ref 98–107)
CO2 SERPL-SCNC: 26.5 MMOL/L (ref 22–29)
CREAT SERPL-MCNC: 0.81 MG/DL (ref 0.57–1)
GLOBULIN SER CALC-MCNC: 2.4 GM/DL
GLUCOSE SERPL-MCNC: 89 MG/DL (ref 65–99)
POTASSIUM SERPL-SCNC: 4.4 MMOL/L (ref 3.5–5.2)
PROT SERPL-MCNC: 6.7 G/DL (ref 6–8.5)
SODIUM SERPL-SCNC: 143 MMOL/L (ref 136–145)
T3 SERPL-MCNC: 99.8 NG/DL (ref 80–200)
T4 FREE SERPL-MCNC: 1.18 NG/DL (ref 0.93–1.7)
TSH SERPL DL<=0.005 MIU/L-ACNC: 1.64 UIU/ML (ref 0.27–4.2)

## 2019-11-15 ENCOUNTER — OFFICE VISIT (OUTPATIENT)
Dept: ENDOCRINOLOGY | Age: 54
End: 2019-11-15

## 2019-11-15 VITALS
HEART RATE: 70 BPM | SYSTOLIC BLOOD PRESSURE: 104 MMHG | DIASTOLIC BLOOD PRESSURE: 68 MMHG | HEIGHT: 60 IN | BODY MASS INDEX: 26.93 KG/M2 | WEIGHT: 137.2 LBS

## 2019-11-15 DIAGNOSIS — E06.3 HASHIMOTO'S DISEASE: ICD-10-CM

## 2019-11-15 DIAGNOSIS — E78.5 HYPERLIPIDEMIA, UNSPECIFIED HYPERLIPIDEMIA TYPE: ICD-10-CM

## 2019-11-15 DIAGNOSIS — E03.9 ADULT HYPOTHYROIDISM: Primary | ICD-10-CM

## 2019-11-15 DIAGNOSIS — E05.80 IATROGENIC HYPERTHYROIDISM: ICD-10-CM

## 2019-11-15 DIAGNOSIS — E03.9 ADULT HYPOTHYROIDISM: ICD-10-CM

## 2019-11-15 DIAGNOSIS — R53.82 CHRONIC FATIGUE: ICD-10-CM

## 2019-11-15 PROCEDURE — 99214 OFFICE O/P EST MOD 30 MIN: CPT | Performed by: INTERNAL MEDICINE

## 2019-11-15 RX ORDER — LEVOTHYROXINE SODIUM 125 MCG
62.5 TABLET ORAL DAILY
Qty: 45 TABLET | Refills: 1 | Status: SHIPPED | OUTPATIENT
Start: 2019-11-15 | End: 2020-04-03 | Stop reason: SDUPTHER

## 2019-11-15 NOTE — PROGRESS NOTES
54 y.o.    Patient Care Team:  Felipe Cedeno MD as PCP - General (Family Medicine)    Chief Complaint:      F/U HYPOTHYROIDISM.  HERE TO DISCUSS LAB RESULTS.     Subjective     HPI   Patient is a 54-year-old white female with a history of hypothyroidism and Hashimoto's thyroiditis came for follow-up    Hypothyroidism  Patient is currently taking 62.5 mcg Synthroid brand name daily  She takes an empty stomach  She reports compliance  She is actually feeling better than before  Vitamin D deficiency  Patient is currently taking vitamin D on a regular basis 5000 units over-the-counter  Chronic fatigue  Patient continues to report fatigue but is slightly better with the current dose  Hyperlipidemia  Patient is currently not on any medication  Patient wants to alternate Synthroid dose from 75-62.5 if possible    Interval History    Patient is a 53-year-old white female with a past history of hypothyroidism for more than 20 years came for a new patient consultation      reports that she was diagnosed with hypothyroidism when she was living in [place] in 1999 and has been on medication ever since  She has been on 75 µg Synthroid for a very long time  She was not able to tolerate the levothyroxine generic hence she was on brand name Synthroid     Patient takes the Synthroid early morning on an empty stomach  She does not eat or drink for at least 2 hours if not longer  She is taking all her medication later in the day  Chronic fatigue  Patient has had fatigue for a very long time but in the past few months her fatigue is getting worse  She used to stay up late until 1:00 or 2:00 AM but now she feels extremely sleepy and tired by 9 PM and goes to bed  Insomnia chronic  Patient reports that despite chronic insomnia her sleep pattern has changed over the past few months and she is going to bed early and waking up late  She has no energy after long night sleep  Patient is also been reporting increasing sweating,  shaking, occasional chest pains and chest pressure, stomach rumbling constantly and increasing hunger at times and significant hair loss.  She denied any specific instances of palpitations and she has been drinking more caffeine to stay up alert during the day     Patient is unaware of Hashimoto's thyroiditis but has a strong family history of thyroid problems on both sides of the family  She is also been expressing chronic headaches     Hyperlipidemia  Patient used to be on statins including Crestor.  Apparently she had significant problems with the statin medication.  She started utilizing red yeast Rice extract over the past couple years  She reports that her lipid profile is improved significantly.  She recently started experiencing lower abdominal symptoms stomach rumbling and achiness and discontinued this extract a few months ago and feels only slightly better now     The following portions of the patient's history were reviewed and updated as appropriate: allergies, current medications, past family history, past medical history, past social history, past surgical history and problem list.    Past Medical History:   Diagnosis Date   • Allergic    • Chronic headaches    • Fibrocystic breast    • Hyperlipidemia    • Hypothyroidism      Family History   Problem Relation Age of Onset   • Heart attack Father    • Cancer Maternal Aunt         breast and pancreatic cancer    • Breast cancer Maternal Aunt    • Pancreatic cancer Maternal Aunt    • Cancer Maternal Grandmother         pancreatic cancer     • Kidney failure Maternal Grandmother    • Cancer Paternal Grandmother         brain tumor      Social History     Socioeconomic History   • Marital status:      Spouse name: Not on file   • Number of children: Not on file   • Years of education: Not on file   • Highest education level: Not on file   Tobacco Use   • Smoking status: Never Smoker   • Smokeless tobacco: Never Used   Substance and Sexual Activity  "  • Alcohol use: Yes     Comment: Rare   • Drug use: No   • Sexual activity: Defer     Allergies   Allergen Reactions   • Nexium [Esomeprazole Magnesium] Other (See Comments) and Dizziness     Weakness     • Penicillins Rash       Current Outpatient Medications:   •  azelastine (ASTELIN) 0.1 % nasal spray, SPRAY ONCE IN EACH NOSTRIL TWICE DAILY, Disp: 90 mL, Rfl: 0  •  Butalbital-APAP-Caffeine -40 MG per capsule, Take 1 capsule by mouth Every 4 (Four) Hours As Needed for headaches., Disp: , Rfl:   •  Cholecalciferol (VITAMIN D3) 5000 units capsule capsule, Take 5,000 Units by mouth Daily., Disp: , Rfl:   •  levocetirizine (XYZAL) 5 MG tablet, Take 1 tablet by mouth Daily., Disp: 90 tablet, Rfl: 3  •  SYNTHROID 125 MCG tablet, Take 0.5 tablets by mouth Daily., Disp: 45 tablet, Rfl: 1        Review of Systems   Constitutional: Negative for chills, fatigue and fever.   Cardiovascular: Negative for chest pain and palpitations.   Gastrointestinal: Positive for constipation. Negative for abdominal pain, diarrhea, nausea and vomiting.   Endocrine: Positive for cold intolerance and heat intolerance.   All other systems reviewed and are negative.      Objective       Vitals:    11/15/19 0916   BP: 104/68   Pulse: 70   Weight: 62.2 kg (137 lb 3.2 oz)   Height: 152.4 cm (60\")     Body mass index is 26.8 kg/m².      Physical Exam   Constitutional: She is oriented to person, place, and time. She appears well-developed and well-nourished.   HENT:   Head: Normocephalic and atraumatic.   Eyes: EOM are normal. Pupils are equal, round, and reactive to light.   No circumorbital puffiness   Neck: Normal range of motion. Neck supple. No thyromegaly present.   Cardiovascular: Normal rate, regular rhythm, normal heart sounds and intact distal pulses.   Pulmonary/Chest: Effort normal and breath sounds normal.   Abdominal: Soft. Bowel sounds are normal. She exhibits no distension.   Musculoskeletal: Normal range of motion. She " exhibits no edema.   Neurological: She is alert and oriented to person, place, and time. She displays normal reflexes.   Skin: Skin is warm and dry.   Psychiatric: She has a normal mood and affect. Her behavior is normal.   Nursing note and vitals reviewed.    Results Review:     I reviewed the patient's new clinical results.    Medical records reviewed  Summary:  Primary care records reviewed from August 2019  Patient is currently not on medication for hyperlipidemia      Results Encounter on 10/23/2019   Component Date Value Ref Range Status   • Glucose 10/23/2019 89  65 - 99 mg/dL Final   • BUN 10/23/2019 8  6 - 20 mg/dL Final   • Creatinine 10/23/2019 0.81  0.57 - 1.00 mg/dL Final   • eGFR Non  Am 10/23/2019 73  >60 mL/min/1.73 Final   • eGFR African Am 10/23/2019 89  >60 mL/min/1.73 Final   • BUN/Creatinine Ratio 10/23/2019 9.9  7.0 - 25.0 Final   • Sodium 10/23/2019 143  136 - 145 mmol/L Final   • Potassium 10/23/2019 4.4  3.5 - 5.2 mmol/L Final   • Chloride 10/23/2019 103  98 - 107 mmol/L Final   • Total CO2 10/23/2019 26.5  22.0 - 29.0 mmol/L Final   • Calcium 10/23/2019 9.6  8.6 - 10.5 mg/dL Final   • Total Protein 10/23/2019 6.7  6.0 - 8.5 g/dL Final   • Albumin 10/23/2019 4.30  3.50 - 5.20 g/dL Final   • Globulin 10/23/2019 2.4  gm/dL Final   • A/G Ratio 10/23/2019 1.8  g/dL Final   • Total Bilirubin 10/23/2019 0.8  0.2 - 1.2 mg/dL Final   • Alkaline Phosphatase 10/23/2019 84  39 - 117 U/L Final   • AST (SGOT) 10/23/2019 30  1 - 32 U/L Final   • ALT (SGPT) 10/23/2019 33  1 - 33 U/L Final   • Free T4 10/23/2019 1.18  0.93 - 1.70 ng/dL Final   • TSH 10/23/2019 1.640  0.270 - 4.200 uIU/mL Final   • T3, Total 10/23/2019 99.8  80.0 - 200.0 ng/dl Final     Lab Results   Component Value Date    HGBA1C 5.30 08/28/2019     Lab Results   Component Value Date    CREATININE 0.81 10/23/2019     Imaging Results (Most Recent)     None                Assessment and Plan:    Zi was seen today for  "hypothyroidism.    Diagnoses and all orders for this visit:    Adult hypothyroidism  -     T4, Free; Future  -     TSH; Future    Iatrogenic hyperthyroidism    Hashimoto's disease    Chronic fatigue    Hyperlipidemia, unspecified hyperlipidemia type    Other orders  -     SYNTHROID 125 MCG tablet; Take 0.5 tablets by mouth Daily.    Adult hypothyroidism  TSH is 1.6  Free T4 1.1  Patient is actually feeling better at the dose of 62.5 mcg daily    Hashimoto's disease     Chronic fatigue  Persistent but slightly better  Iatrogenic hyperthyroidism  Patient is frequently trying to increase the dose but I recommended against it  The only other option she might do would be 62.5 mcg Monday through Friday and take 75 mcg on Saturday and Sunday  Patient will think it over and let us know  Patient wants blood testing done in 6 to 8 weeks time to confirm stability    Hyperlipidemia, unspecified hyperlipidemia type  Patient has significantly elevated total cholesterol at 258 and LDL at 180  She is currently not on medication      George Love MD. FACE    11/15/19      EMR Dragon / transcription disclaimer:     \"Dictated utilizing Dragon dictation\".         "

## 2019-12-02 DIAGNOSIS — Z91.09 ENVIRONMENTAL ALLERGIES: ICD-10-CM

## 2019-12-02 RX ORDER — AZELASTINE 1 MG/ML
2 SPRAY, METERED NASAL ONCE
Qty: 90 ML | Refills: 3 | Status: SHIPPED | OUTPATIENT
Start: 2019-12-02 | End: 2019-12-02

## 2019-12-04 ENCOUNTER — TRANSCRIBE ORDERS (OUTPATIENT)
Dept: ADMINISTRATIVE | Facility: HOSPITAL | Age: 54
End: 2019-12-04

## 2019-12-04 DIAGNOSIS — G60.9 IDIOPATHIC PERIPHERAL NEUROPATHY: ICD-10-CM

## 2019-12-04 DIAGNOSIS — R42 GIDDINESS: ICD-10-CM

## 2019-12-04 DIAGNOSIS — R42 DIZZINESS: Primary | ICD-10-CM

## 2019-12-30 ENCOUNTER — HOSPITAL ENCOUNTER (OUTPATIENT)
Dept: MRI IMAGING | Facility: HOSPITAL | Age: 54
Discharge: HOME | End: 2019-12-30

## 2019-12-30 ENCOUNTER — HOSPITAL ENCOUNTER (OUTPATIENT)
Dept: INFUSION THERAPY | Facility: HOSPITAL | Age: 54
Discharge: HOME | End: 2019-12-30
Admitting: PSYCHIATRY & NEUROLOGY

## 2019-12-30 DIAGNOSIS — R42 GIDDINESS: ICD-10-CM

## 2019-12-30 DIAGNOSIS — G60.9 IDIOPATHIC PERIPHERAL NEUROPATHY: ICD-10-CM

## 2019-12-30 DIAGNOSIS — R42 DIZZINESS: ICD-10-CM

## 2019-12-30 PROCEDURE — 70551 MRI BRAIN STEM W/O DYE: CPT

## 2019-12-30 PROCEDURE — 95886 MUSC TEST DONE W/N TEST COMP: CPT

## 2019-12-30 PROCEDURE — 95911 NRV CNDJ TEST 9-10 STUDIES: CPT

## 2019-12-30 PROCEDURE — 95886 MUSC TEST DONE W/N TEST COMP: CPT | Performed by: PSYCHIATRY & NEUROLOGY

## 2019-12-30 PROCEDURE — 95911 NRV CNDJ TEST 9-10 STUDIES: CPT | Performed by: PSYCHIATRY & NEUROLOGY

## 2020-01-03 ENCOUNTER — RESULTS ENCOUNTER (OUTPATIENT)
Dept: ENDOCRINOLOGY | Age: 55
End: 2020-01-03

## 2020-01-03 DIAGNOSIS — E03.9 ADULT HYPOTHYROIDISM: Primary | ICD-10-CM

## 2020-01-03 DIAGNOSIS — E03.9 ADULT HYPOTHYROIDISM: ICD-10-CM

## 2020-01-04 LAB
ALBUMIN SERPL-MCNC: 4.2 G/DL (ref 3.5–5.2)
ALBUMIN/GLOB SERPL: 1.6 G/DL
ALP SERPL-CCNC: 95 U/L (ref 39–117)
ALT SERPL-CCNC: 33 U/L (ref 1–33)
AST SERPL-CCNC: 25 U/L (ref 1–32)
BILIRUB SERPL-MCNC: 1.2 MG/DL (ref 0.2–1.2)
BUN SERPL-MCNC: 9 MG/DL (ref 6–20)
BUN/CREAT SERPL: 10.1 (ref 7–25)
CALCIUM SERPL-MCNC: 9.7 MG/DL (ref 8.6–10.5)
CHLORIDE SERPL-SCNC: 100 MMOL/L (ref 98–107)
CO2 SERPL-SCNC: 28.3 MMOL/L (ref 22–29)
CREAT SERPL-MCNC: 0.89 MG/DL (ref 0.57–1)
GLOBULIN SER CALC-MCNC: 2.7 GM/DL
GLUCOSE SERPL-MCNC: 89 MG/DL (ref 65–99)
POTASSIUM SERPL-SCNC: 4.4 MMOL/L (ref 3.5–5.2)
PROT SERPL-MCNC: 6.9 G/DL (ref 6–8.5)
SODIUM SERPL-SCNC: 140 MMOL/L (ref 136–145)
T4 FREE SERPL-MCNC: 1.09 NG/DL (ref 0.93–1.7)
TSH SERPL DL<=0.005 MIU/L-ACNC: 10.2 UIU/ML (ref 0.27–4.2)

## 2020-01-08 ENCOUNTER — TELEPHONE (OUTPATIENT)
Dept: ENDOCRINOLOGY | Age: 55
End: 2020-01-08

## 2020-01-08 NOTE — TELEPHONE ENCOUNTER
Patient states that she is taking medication as she should, morning on empty stomach with water.  She has in the past 2 weeks been on zpack and mucinex-d, advil, and tamiflu. She needs to know if there will be an increase or dies she need to wait until next appointment.

## 2020-01-14 ENCOUNTER — TELEPHONE (OUTPATIENT)
Dept: ENDOCRINOLOGY | Age: 55
End: 2020-01-14

## 2020-01-31 LAB
ALBUMIN SERPL-MCNC: 4.5 G/DL (ref 3.8–4.9)
ALBUMIN/GLOB SERPL: 2 {RATIO} (ref 1.2–2.2)
ALP SERPL-CCNC: 85 IU/L (ref 39–117)
ALT SERPL-CCNC: 29 IU/L (ref 0–32)
AST SERPL-CCNC: 32 IU/L (ref 0–40)
BILIRUB SERPL-MCNC: 0.9 MG/DL (ref 0–1.2)
BUN SERPL-MCNC: 9 MG/DL (ref 6–24)
BUN/CREAT SERPL: 10 (ref 9–23)
CALCIUM SERPL-MCNC: 9.8 MG/DL (ref 8.7–10.2)
CHLORIDE SERPL-SCNC: 100 MMOL/L (ref 96–106)
CO2 SERPL-SCNC: 26 MMOL/L (ref 20–29)
CREAT SERPL-MCNC: 0.89 MG/DL (ref 0.57–1)
GLOBULIN SER CALC-MCNC: 2.3 G/DL (ref 1.5–4.5)
GLUCOSE SERPL-MCNC: 91 MG/DL (ref 65–99)
POTASSIUM SERPL-SCNC: 4.2 MMOL/L (ref 3.5–5.2)
PROT SERPL-MCNC: 6.8 G/DL (ref 6–8.5)
SODIUM SERPL-SCNC: 141 MMOL/L (ref 134–144)
T4 FREE SERPL-MCNC: 1.23 NG/DL (ref 0.82–1.77)
TSH SERPL DL<=0.005 MIU/L-ACNC: 1.74 UIU/ML (ref 0.45–4.5)

## 2020-02-10 ENCOUNTER — OFFICE VISIT (OUTPATIENT)
Dept: FAMILY MEDICINE CLINIC | Facility: CLINIC | Age: 55
End: 2020-02-10

## 2020-02-10 VITALS
WEIGHT: 134 LBS | SYSTOLIC BLOOD PRESSURE: 118 MMHG | HEIGHT: 60 IN | BODY MASS INDEX: 26.31 KG/M2 | DIASTOLIC BLOOD PRESSURE: 62 MMHG | HEART RATE: 71 BPM | TEMPERATURE: 98.2 F | OXYGEN SATURATION: 93 % | RESPIRATION RATE: 14 BRPM

## 2020-02-10 DIAGNOSIS — R73.09 ELEVATED GLUCOSE: ICD-10-CM

## 2020-02-10 DIAGNOSIS — R53.82 CHRONIC FATIGUE: ICD-10-CM

## 2020-02-10 DIAGNOSIS — J32.9 SINOBRONCHITIS: ICD-10-CM

## 2020-02-10 DIAGNOSIS — J40 SINOBRONCHITIS: ICD-10-CM

## 2020-02-10 DIAGNOSIS — E78.5 HYPERLIPIDEMIA, UNSPECIFIED HYPERLIPIDEMIA TYPE: Primary | ICD-10-CM

## 2020-02-10 DIAGNOSIS — E03.9 ADULT HYPOTHYROIDISM: ICD-10-CM

## 2020-02-10 PROCEDURE — 99214 OFFICE O/P EST MOD 30 MIN: CPT | Performed by: FAMILY MEDICINE

## 2020-02-10 RX ORDER — AZELASTINE 1 MG/ML
1 SPRAY, METERED NASAL DAILY PRN
COMMUNITY
Start: 2019-12-02

## 2020-02-10 NOTE — PROGRESS NOTES
Zi Jasso is a 54 y.o. female.     Chief Complaint   Patient presents with   • Dizziness     patient is having dizziness, fatigue,and  dry skin.    • Fatigue     patient is having weakness and low energy   • Cough     patient is having cough, and congestion       HPI     Patient is here today to discuss a number of issues.  Has hyperlipidemia, hypothyroidism, chronic fatigue, elevated glucose levels.  Also with 7 to 10 days of sinus congestion, bilateral ear fullness, and cough.  Not responding to over-the-counter therapies but has responded to some cough syrup with codeine.  Requesting refill today.  Currently taking Synthroid 125 mcg.  In need of blood work today to evaluate.    The following portions of the patient's history were reviewed and updated as appropriate: allergies, current medications, past family history, past medical history, past social history, past surgical history and problem list.    Review of Systems   Constitutional: Positive for fatigue.   HENT: Positive for congestion.    Respiratory: Positive for cough.    Neurological: Positive for dizziness and weakness.   All other systems reviewed and are negative.    I have reviewed the ROS as documented by the MA. Felipe Cedeno MD    Objective  Vitals:    02/10/20 1036   BP: 118/62   Pulse: 71   Resp: 14   Temp: 98.2 °F (36.8 °C)   SpO2: 93%     Body mass index is 26.17 kg/m².    Physical Exam   Constitutional: She is oriented to person, place, and time. She appears well-developed and well-nourished. No distress.   HENT:   Head: Normocephalic and atraumatic.   Right Ear: Tympanic membrane, external ear and ear canal normal.   Left Ear: Tympanic membrane, external ear and ear canal normal.   Nose: Mucosal edema and rhinorrhea present. Right sinus exhibits no maxillary sinus tenderness and no frontal sinus tenderness. Left sinus exhibits no maxillary sinus tenderness and no frontal sinus tenderness.   Mouth/Throat: Uvula is midline. Posterior  oropharyngeal erythema present. No oropharyngeal exudate, posterior oropharyngeal edema or tonsillar abscesses. No tonsillar exudate.   Eyes: Pupils are equal, round, and reactive to light. Conjunctivae, EOM and lids are normal.   Cardiovascular: Normal rate, regular rhythm and normal heart sounds. Exam reveals no friction rub.   No murmur heard.  Pulmonary/Chest: Effort normal and breath sounds normal. No respiratory distress. She has no wheezes. She has no rales.   Abdominal: Soft. Bowel sounds are normal. She exhibits no distension. There is no tenderness. There is no rebound and no guarding.   Neurological: She is alert and oriented to person, place, and time.   Skin: Skin is warm and dry. She is not diaphoretic.   Nursing note and vitals reviewed.        Current Outpatient Medications:   •  Butalbital-APAP-Caffeine -40 MG per capsule, Take 1 capsule by mouth Every 4 (Four) Hours As Needed for headaches., Disp: , Rfl:   •  Cholecalciferol (VITAMIN D3) 5000 units capsule capsule, Take 5,000 Units by mouth Daily., Disp: , Rfl:   •  levocetirizine (XYZAL) 5 MG tablet, Take 1 tablet by mouth Daily., Disp: 90 tablet, Rfl: 3  •  SYNTHROID 125 MCG tablet, Take 0.5 tablets by mouth Daily., Disp: 45 tablet, Rfl: 1  •  azelastine (ASTELIN) 0.1 % nasal spray, , Disp: , Rfl:   •  Promethazine-Phenyleph-Codeine (PROMETHAZINE VC/CODEINE) 6.25-5-10 MG/5ML syrup, Take 5 mL by mouth 2 (Two) Times a Day As Needed (Cough)., Disp: 180 mL, Rfl: 0  Current outpatient and discharge medications have been reconciled for the patient.  Reviewed by: Felipe Cedeno MD      Procedures    Lab Results (most recent)     None                  Zi was seen today for dizziness, fatigue and cough.    Diagnoses and all orders for this visit:    Hyperlipidemia, unspecified hyperlipidemia type  -     Comprehensive Metabolic Panel  -     Lipid Panel  -     Hemoglobin A1c  -     Thyroid Panel With TSH  -     CBC Auto Differential    Adult  hypothyroidism  -     Comprehensive Metabolic Panel  -     Lipid Panel  -     Hemoglobin A1c  -     Thyroid Panel With TSH  -     CBC Auto Differential  -     US Thyroid; Future    Chronic fatigue  -     Comprehensive Metabolic Panel  -     Lipid Panel  -     Hemoglobin A1c  -     Thyroid Panel With TSH  -     CBC Auto Differential  -     US Thyroid; Future    Elevated glucose  -     Comprehensive Metabolic Panel  -     Lipid Panel  -     Hemoglobin A1c  -     Thyroid Panel With TSH  -     CBC Auto Differential    Sinobronchitis  -     Promethazine-Phenyleph-Codeine (PROMETHAZINE VC/CODEINE) 6.25-5-10 MG/5ML syrup; Take 5 mL by mouth 2 (Two) Times a Day As Needed (Cough).    Labs as above to evaluate the status of these chronic medical issues.  Adjust treatment plan accordingly.  Prescription given for cough syrup with codeine.      Return for As needed.      Felipe Cedeno MD

## 2020-02-11 ENCOUNTER — TELEPHONE (OUTPATIENT)
Dept: FAMILY MEDICINE CLINIC | Facility: CLINIC | Age: 55
End: 2020-02-11

## 2020-02-11 LAB
ALBUMIN SERPL-MCNC: 4.2 G/DL (ref 3.5–5.2)
ALBUMIN/GLOB SERPL: 1.7 G/DL
ALP SERPL-CCNC: 92 U/L (ref 39–117)
ALT SERPL-CCNC: 29 U/L (ref 1–33)
AST SERPL-CCNC: 28 U/L (ref 1–32)
BASOPHILS # BLD AUTO: 0.03 10*3/MM3 (ref 0–0.2)
BASOPHILS NFR BLD AUTO: 0.6 % (ref 0–1.5)
BILIRUB SERPL-MCNC: 0.9 MG/DL (ref 0.2–1.2)
BUN SERPL-MCNC: 12 MG/DL (ref 6–20)
BUN/CREAT SERPL: 13.6 (ref 7–25)
CALCIUM SERPL-MCNC: 9.4 MG/DL (ref 8.6–10.5)
CHLORIDE SERPL-SCNC: 101 MMOL/L (ref 98–107)
CHOLEST SERPL-MCNC: 259 MG/DL (ref 0–200)
CO2 SERPL-SCNC: 26.3 MMOL/L (ref 22–29)
CREAT SERPL-MCNC: 0.88 MG/DL (ref 0.57–1)
EOSINOPHIL # BLD AUTO: 0.23 10*3/MM3 (ref 0–0.4)
EOSINOPHIL NFR BLD AUTO: 5 % (ref 0.3–6.2)
ERYTHROCYTE [DISTWIDTH] IN BLOOD BY AUTOMATED COUNT: 12.6 % (ref 12.3–15.4)
FT4I SERPL CALC-MCNC: 1.8 (ref 1.2–4.9)
GLOBULIN SER CALC-MCNC: 2.5 GM/DL
GLUCOSE SERPL-MCNC: 86 MG/DL (ref 65–99)
HBA1C MFR BLD: 5.5 % (ref 4.8–5.6)
HCT VFR BLD AUTO: 39.9 % (ref 34–46.6)
HDLC SERPL-MCNC: 59 MG/DL (ref 40–60)
HGB BLD-MCNC: 13.7 G/DL (ref 12–15.9)
IMM GRANULOCYTES # BLD AUTO: 0.02 10*3/MM3 (ref 0–0.05)
IMM GRANULOCYTES NFR BLD AUTO: 0.4 % (ref 0–0.5)
LDLC SERPL CALC-MCNC: 183 MG/DL (ref 0–100)
LYMPHOCYTES # BLD AUTO: 0.78 10*3/MM3 (ref 0.7–3.1)
LYMPHOCYTES NFR BLD AUTO: 16.8 % (ref 19.6–45.3)
MCH RBC QN AUTO: 30.4 PG (ref 26.6–33)
MCHC RBC AUTO-ENTMCNC: 34.3 G/DL (ref 31.5–35.7)
MCV RBC AUTO: 88.7 FL (ref 79–97)
MONOCYTES # BLD AUTO: 0.68 10*3/MM3 (ref 0.1–0.9)
MONOCYTES NFR BLD AUTO: 14.7 % (ref 5–12)
NEUTROPHILS # BLD AUTO: 2.9 10*3/MM3 (ref 1.7–7)
NEUTROPHILS NFR BLD AUTO: 62.5 % (ref 42.7–76)
NRBC BLD AUTO-RTO: 0 /100 WBC (ref 0–0.2)
PLATELET # BLD AUTO: 188 10*3/MM3 (ref 140–450)
POTASSIUM SERPL-SCNC: 4 MMOL/L (ref 3.5–5.2)
PROT SERPL-MCNC: 6.7 G/DL (ref 6–8.5)
RBC # BLD AUTO: 4.5 10*6/MM3 (ref 3.77–5.28)
SODIUM SERPL-SCNC: 140 MMOL/L (ref 136–145)
T3RU NFR SERPL: 26 % (ref 24–39)
T4 SERPL-MCNC: 7.1 UG/DL (ref 4.5–12)
TRIGL SERPL-MCNC: 87 MG/DL (ref 0–150)
TSH SERPL DL<=0.005 MIU/L-ACNC: 1.89 UIU/ML (ref 0.45–4.5)
VLDLC SERPL CALC-MCNC: 17.4 MG/DL
WBC # BLD AUTO: 4.64 10*3/MM3 (ref 3.4–10.8)

## 2020-02-11 NOTE — TELEPHONE ENCOUNTER
"Spoke with pt's , Orville. Orville is requesting another appointment for Zi as soon as possible. Orville states that Zi has \"turned for the worse.\" she is having difficulty breathing and has been in bed for the past 16 hours. Pt was unable to  the cough syrup that was prescribed yesterday. I informed Orville that there is no more acute availability today, possibly tomorrow and can call back for same day appointments, and/or take Zi to urgent care or the emergency room. Orville states that the ER is too expensive and he would like to see what  advises?  "

## 2020-02-12 NOTE — TELEPHONE ENCOUNTER
I would advise that the patient should be seen at least in the urgent care or ER.  If I have space on the schedule tomorrow I am happy to see her.

## 2020-02-13 ENCOUNTER — OFFICE VISIT (OUTPATIENT)
Dept: FAMILY MEDICINE CLINIC | Facility: CLINIC | Age: 55
End: 2020-02-13

## 2020-02-13 VITALS
RESPIRATION RATE: 14 BRPM | HEIGHT: 60 IN | TEMPERATURE: 97.8 F | HEART RATE: 71 BPM | BODY MASS INDEX: 26.31 KG/M2 | DIASTOLIC BLOOD PRESSURE: 64 MMHG | WEIGHT: 134 LBS | SYSTOLIC BLOOD PRESSURE: 112 MMHG | OXYGEN SATURATION: 97 %

## 2020-02-13 DIAGNOSIS — J40 SINOBRONCHITIS: Primary | ICD-10-CM

## 2020-02-13 DIAGNOSIS — J32.9 SINOBRONCHITIS: Primary | ICD-10-CM

## 2020-02-13 PROCEDURE — 99214 OFFICE O/P EST MOD 30 MIN: CPT | Performed by: FAMILY MEDICINE

## 2020-02-13 RX ORDER — PREDNISONE 20 MG/1
40 TABLET ORAL DAILY
Qty: 10 TABLET | Refills: 0 | Status: SHIPPED | OUTPATIENT
Start: 2020-02-13 | End: 2020-02-18

## 2020-02-13 RX ORDER — CEPHALEXIN 500 MG/1
500 CAPSULE ORAL 2 TIMES DAILY
Qty: 20 CAPSULE | Refills: 0 | Status: SHIPPED | OUTPATIENT
Start: 2020-02-13 | End: 2020-02-23

## 2020-02-13 NOTE — PROGRESS NOTES
Zi Jasso is a 54 y.o. female.     Chief Complaint   Patient presents with   • Dizziness     patient is not feeling any better   • Fatigue       HPI     Patient presents with increasing sinus congestion, sore throat, drainage, cough.  Symptoms getting worse.  Not responding to over-the-counter symptomatic management.  No fever but has had chills.  Symptoms have been getting worse for roughly 2 weeks.    The following portions of the patient's history were reviewed and updated as appropriate: allergies, current medications, past family history, past medical history, past social history, past surgical history and problem list.    Review of Systems   Constitutional: Positive for fatigue.   HENT: Positive for congestion.    Neurological: Positive for dizziness.   All other systems reviewed and are negative.    I have reviewed the ROS as documented by the MA. Felipe Cedeno MD    Objective  Vitals:    02/13/20 1451   BP: 112/64   Pulse: 71   Resp: 14   Temp: 97.8 °F (36.6 °C)   SpO2: 97%     Body mass index is 26.17 kg/m².    Physical Exam   Constitutional: She is oriented to person, place, and time. She appears well-developed and well-nourished. No distress.   HENT:   Head: Normocephalic and atraumatic.   Right Ear: External ear and ear canal normal. No drainage, swelling or tenderness. Tympanic membrane is bulging. Tympanic membrane is not injected and not erythematous. Tympanic membrane mobility is normal. No middle ear effusion. Decreased hearing is noted.   Left Ear: External ear and ear canal normal. No drainage, swelling or tenderness. Tympanic membrane is bulging. Tympanic membrane is not injected and not erythematous. Tympanic membrane mobility is normal.  No middle ear effusion. Decreased hearing is noted.   Nose: Mucosal edema and rhinorrhea present. Right sinus exhibits maxillary sinus tenderness and frontal sinus tenderness. Left sinus exhibits maxillary sinus tenderness and frontal sinus tenderness.    Mouth/Throat: Uvula is midline. Posterior oropharyngeal erythema present. No oropharyngeal exudate, posterior oropharyngeal edema or tonsillar abscesses. No tonsillar exudate.   Eyes: Pupils are equal, round, and reactive to light. Conjunctivae and EOM are normal. Right eye exhibits no discharge. Left eye exhibits no discharge. No scleral icterus.   Neck: Normal range of motion. Neck supple.   Cardiovascular: Normal rate, regular rhythm and normal heart sounds. Exam reveals no friction rub.   No murmur heard.  Pulmonary/Chest: Effort normal and breath sounds normal. No respiratory distress. She has no wheezes. She has no rales.   Abdominal: Soft. Bowel sounds are normal. She exhibits no distension. There is no tenderness. There is no rebound and no guarding.   Lymphadenopathy:     She has no cervical adenopathy.   Neurological: She is alert and oriented to person, place, and time.   Skin: Skin is warm and dry. She is not diaphoretic.   Nursing note and vitals reviewed.        Current Outpatient Medications:   •  azelastine (ASTELIN) 0.1 % nasal spray, , Disp: , Rfl:   •  Butalbital-APAP-Caffeine -40 MG per capsule, Take 1 capsule by mouth Every 4 (Four) Hours As Needed for headaches., Disp: , Rfl:   •  Cholecalciferol (VITAMIN D3) 5000 units capsule capsule, Take 5,000 Units by mouth Daily., Disp: , Rfl:   •  levocetirizine (XYZAL) 5 MG tablet, Take 1 tablet by mouth Daily., Disp: 90 tablet, Rfl: 3  •  Promethazine-Phenyleph-Codeine (PROMETHAZINE VC/CODEINE) 6.25-5-10 MG/5ML syrup, Take 5 mL by mouth 2 (Two) Times a Day As Needed (Cough)., Disp: 180 mL, Rfl: 0  •  SYNTHROID 125 MCG tablet, Take 0.5 tablets by mouth Daily., Disp: 45 tablet, Rfl: 1  •  cephalexin (KEFLEX) 500 MG capsule, Take 1 capsule by mouth 2 (Two) Times a Day for 10 days., Disp: 20 capsule, Rfl: 0  •  predniSONE (DELTASONE) 20 MG tablet, Take 2 tablets by mouth Daily for 5 days., Disp: 10 tablet, Rfl: 0  Current outpatient and discharge  medications have been reconciled for the patient.  Reviewed by: Felipe Cedeno MD      Procedures    Lab Results (most recent)     None                  Zi was seen today for dizziness and fatigue.    Diagnoses and all orders for this visit:    Sinobronchitis  -     cephalexin (KEFLEX) 500 MG capsule; Take 1 capsule by mouth 2 (Two) Times a Day for 10 days.  -     predniSONE (DELTASONE) 20 MG tablet; Take 2 tablets by mouth Daily for 5 days.    Antibiotics and steroids as above.  Discussed other symptomatic management.      Return for As needed.      Felipe Cedeno MD

## 2020-02-20 ENCOUNTER — TELEPHONE (OUTPATIENT)
Dept: ENDOCRINOLOGY | Age: 55
End: 2020-02-20

## 2020-02-20 NOTE — TELEPHONE ENCOUNTER
CELESTINO:    Pt called wanted to change her agustín for later since she was going out of town. I told the pt I did not have anything until second week of may she stated she would just keep her agustín but the pt also stated she wanted to make a lab agustín the same day but in the morning. I told the pt she could just get them done after her visit with the dr. She stated she wanted them done before she seen the dr. I than told the pt the results would not be ready when she seen the dr the same day she stated she understood that and said well maybe they can call me with the result.

## 2020-02-21 ENCOUNTER — APPOINTMENT (OUTPATIENT)
Dept: ULTRASOUND IMAGING | Facility: HOSPITAL | Age: 55
End: 2020-02-21

## 2020-02-28 ENCOUNTER — TELEPHONE (OUTPATIENT)
Dept: FAMILY MEDICINE CLINIC | Facility: CLINIC | Age: 55
End: 2020-02-28

## 2020-02-28 RX ORDER — DEXTROMETHORPHAN HYDROBROMIDE AND PROMETHAZINE HYDROCHLORIDE 15; 6.25 MG/5ML; MG/5ML
1.25 SYRUP ORAL 4 TIMES DAILY PRN
Qty: 118 ML | Refills: 0 | Status: SHIPPED | OUTPATIENT
Start: 2020-02-28 | End: 2020-06-10

## 2020-02-28 NOTE — TELEPHONE ENCOUNTER
Pt left message saying that she still experience cough  and sore throat, however over all she feel much better, she needs to know what else to do for this?  However she has not been taking Promethazine-Phenyleph-Codeine  It has been on back order, pharmacy suggested Promethazine DM available new script sent to the pharmacy for  Promethazine DM. Pt is informed , advise pt to start her cough med by Monday is it does not get any better call office back for further suggestions.

## 2020-03-09 ENCOUNTER — TELEPHONE (OUTPATIENT)
Dept: ENDOCRINOLOGY | Age: 55
End: 2020-03-09

## 2020-03-09 DIAGNOSIS — E03.9 ADULT HYPOTHYROIDISM: Primary | ICD-10-CM

## 2020-03-09 DIAGNOSIS — E03.9 ADULT HYPOTHYROIDISM: ICD-10-CM

## 2020-03-09 NOTE — TELEPHONE ENCOUNTER
Please send lab orders to   Phone number [phone] [address]  Fax     Lab [facility]    She wants to have them done tomorrow

## 2020-03-13 LAB
T4 FREE SERPL-MCNC: 1.04 NG/DL (ref 0.82–1.77)
TSH SERPL DL<=0.005 MIU/L-ACNC: 3.59 UIU/ML (ref 0.45–4.5)

## 2020-04-03 DIAGNOSIS — E05.80 IATROGENIC HYPERTHYROIDISM: ICD-10-CM

## 2020-04-03 DIAGNOSIS — E06.3 HASHIMOTO'S DISEASE: ICD-10-CM

## 2020-04-03 DIAGNOSIS — E03.9 ADULT HYPOTHYROIDISM: Primary | ICD-10-CM

## 2020-04-03 RX ORDER — LEVOTHYROXINE SODIUM 125 MCG
62.5 TABLET ORAL DAILY
Qty: 45 TABLET | Refills: 3 | Status: SHIPPED | OUTPATIENT
Start: 2020-04-03 | End: 2021-03-23 | Stop reason: SDUPTHER

## 2020-05-08 DIAGNOSIS — R53.82 CHRONIC FATIGUE: ICD-10-CM

## 2020-05-08 DIAGNOSIS — E06.3 HASHIMOTO'S DISEASE: Primary | ICD-10-CM

## 2020-05-09 LAB
ALBUMIN SERPL-MCNC: 4.3 G/DL (ref 3.8–4.9)
ALBUMIN/GLOB SERPL: 1.8 {RATIO} (ref 1.2–2.2)
ALP SERPL-CCNC: 95 IU/L (ref 39–117)
ALT SERPL-CCNC: 38 IU/L (ref 0–32)
AST SERPL-CCNC: 44 IU/L (ref 0–40)
BILIRUB SERPL-MCNC: 1.2 MG/DL (ref 0–1.2)
BUN SERPL-MCNC: 13 MG/DL (ref 6–24)
BUN/CREAT SERPL: 14 (ref 9–23)
CALCIUM SERPL-MCNC: 9.7 MG/DL (ref 8.7–10.2)
CHLORIDE SERPL-SCNC: 102 MMOL/L (ref 96–106)
CO2 SERPL-SCNC: 23 MMOL/L (ref 20–29)
CREAT SERPL-MCNC: 0.9 MG/DL (ref 0.57–1)
GLOBULIN SER CALC-MCNC: 2.4 G/DL (ref 1.5–4.5)
GLUCOSE SERPL-MCNC: 89 MG/DL (ref 65–99)
POTASSIUM SERPL-SCNC: 4.4 MMOL/L (ref 3.5–5.2)
PROT SERPL-MCNC: 6.7 G/DL (ref 6–8.5)
SODIUM SERPL-SCNC: 139 MMOL/L (ref 134–144)
T4 FREE SERPL-MCNC: 1.23 NG/DL (ref 0.82–1.77)
TSH SERPL DL<=0.005 MIU/L-ACNC: 1.47 UIU/ML (ref 0.45–4.5)

## 2020-05-29 ENCOUNTER — TELEPHONE (OUTPATIENT)
Dept: FAMILY MEDICINE CLINIC | Facility: CLINIC | Age: 55
End: 2020-05-29

## 2020-05-29 DIAGNOSIS — E06.3 HASHIMOTO'S DISEASE: ICD-10-CM

## 2020-05-29 DIAGNOSIS — R53.82 CHRONIC FATIGUE: ICD-10-CM

## 2020-05-29 DIAGNOSIS — R73.09 ELEVATED GLUCOSE: ICD-10-CM

## 2020-05-29 DIAGNOSIS — E03.9 ADULT HYPOTHYROIDISM: ICD-10-CM

## 2020-05-29 DIAGNOSIS — E78.5 HYPERLIPIDEMIA, UNSPECIFIED HYPERLIPIDEMIA TYPE: Primary | ICD-10-CM

## 2020-06-02 ENCOUNTER — TELEPHONE (OUTPATIENT)
Dept: FAMILY MEDICINE CLINIC | Facility: CLINIC | Age: 55
End: 2020-06-02

## 2020-06-02 LAB
ALBUMIN SERPL-MCNC: 4.3 G/DL (ref 3.5–5.2)
ALBUMIN/GLOB SERPL: 1.8 G/DL
ALP SERPL-CCNC: 80 U/L (ref 39–117)
ALT SERPL-CCNC: 21 U/L (ref 1–33)
AST SERPL-CCNC: 28 U/L (ref 1–32)
BASOPHILS # BLD AUTO: 0.03 10*3/MM3 (ref 0–0.2)
BASOPHILS NFR BLD AUTO: 0.7 % (ref 0–1.5)
BILIRUB SERPL-MCNC: 1 MG/DL (ref 0.2–1.2)
BUN SERPL-MCNC: 9 MG/DL (ref 6–20)
BUN/CREAT SERPL: 11.5 (ref 7–25)
CALCIUM SERPL-MCNC: 9.4 MG/DL (ref 8.6–10.5)
CHLORIDE SERPL-SCNC: 104 MMOL/L (ref 98–107)
CHOLEST SERPL-MCNC: 252 MG/DL (ref 0–200)
CO2 SERPL-SCNC: 27.5 MMOL/L (ref 22–29)
CREAT SERPL-MCNC: 0.78 MG/DL (ref 0.57–1)
EOSINOPHIL # BLD AUTO: 0.62 10*3/MM3 (ref 0–0.4)
EOSINOPHIL NFR BLD AUTO: 13.9 % (ref 0.3–6.2)
ERYTHROCYTE [DISTWIDTH] IN BLOOD BY AUTOMATED COUNT: 12.6 % (ref 12.3–15.4)
FT4I SERPL CALC-MCNC: 2.1 (ref 1.2–4.9)
GLOBULIN SER CALC-MCNC: 2.4 GM/DL
GLUCOSE SERPL-MCNC: 90 MG/DL (ref 65–99)
HCT VFR BLD AUTO: 39.1 % (ref 34–46.6)
HDLC SERPL-MCNC: 60 MG/DL (ref 40–60)
HGB BLD-MCNC: 13.2 G/DL (ref 12–15.9)
IMM GRANULOCYTES # BLD AUTO: 0 10*3/MM3 (ref 0–0.05)
IMM GRANULOCYTES NFR BLD AUTO: 0 % (ref 0–0.5)
LDLC SERPL CALC-MCNC: 176 MG/DL (ref 0–100)
LDLC/HDLC SERPL: 2.93 {RATIO}
LYMPHOCYTES # BLD AUTO: 1.3 10*3/MM3 (ref 0.7–3.1)
LYMPHOCYTES NFR BLD AUTO: 29.1 % (ref 19.6–45.3)
MCH RBC QN AUTO: 29.4 PG (ref 26.6–33)
MCHC RBC AUTO-ENTMCNC: 33.8 G/DL (ref 31.5–35.7)
MCV RBC AUTO: 87.1 FL (ref 79–97)
MONOCYTES # BLD AUTO: 0.44 10*3/MM3 (ref 0.1–0.9)
MONOCYTES NFR BLD AUTO: 9.8 % (ref 5–12)
NEUTROPHILS # BLD AUTO: 2.08 10*3/MM3 (ref 1.7–7)
NEUTROPHILS NFR BLD AUTO: 46.5 % (ref 42.7–76)
NRBC BLD AUTO-RTO: 0 /100 WBC (ref 0–0.2)
PLATELET # BLD AUTO: 204 10*3/MM3 (ref 140–450)
POTASSIUM SERPL-SCNC: 4 MMOL/L (ref 3.5–5.2)
PROT SERPL-MCNC: 6.7 G/DL (ref 6–8.5)
RBC # BLD AUTO: 4.49 10*6/MM3 (ref 3.77–5.28)
SODIUM SERPL-SCNC: 140 MMOL/L (ref 136–145)
T3RU NFR SERPL: 26 % (ref 24–39)
T4 SERPL-MCNC: 7.9 UG/DL (ref 4.5–12)
TRIGL SERPL-MCNC: 82 MG/DL (ref 0–150)
TSH SERPL DL<=0.005 MIU/L-ACNC: 2.89 UIU/ML (ref 0.45–4.5)
VLDLC SERPL CALC-MCNC: 16.4 MG/DL
WBC # BLD AUTO: 4.47 10*3/MM3 (ref 3.4–10.8)

## 2020-06-02 NOTE — TELEPHONE ENCOUNTER
Zi is calling in with complaints of persistent stomach pain for several years. Zi were referral to Gastro, Dr.Andrew Garg back in 2018. Zi would like to schedule follow-up with ? Should pt follow up with Gastro instead?Please advise.

## 2020-06-04 ENCOUNTER — TELEPHONE (OUTPATIENT)
Dept: FAMILY MEDICINE CLINIC | Facility: CLINIC | Age: 55
End: 2020-06-04

## 2020-06-04 NOTE — TELEPHONE ENCOUNTER
PATIENT CALLED TO SEE IF SHE CAN GET AN EARLIER APPT OR IF THE DOCTOR CAN FIT HER IN EARLIER. SHE ADVISED THAT SHE SPOKE WITH SOMEONE IN THE OFFICE AND THEY TOLD HER SHE COULDN'T GET IN UNTIL 6/15 BUT ADVISED THE PATIENT THAT SHE WAS GOING TO SEE IF THAT COULD GET HER IN SOONER THEN THAT AND WOULD CALL THE PATIENT BACK.   PATIENT CALLED BACK TODAY DUE TO NOT HEARING FROM ANYONE AND HAD THE SCHEDULE WAS PUSHED BACK TO 6/30 NOW. PATIENT WOULD LIKE TO SEE IF SE CAN GET WORKED IN ON EITHER Monday OR Tuesday DUE TO HER TRAVELING FOR WORK.     PLEASE ADVISE PATIENT [phone].

## 2020-06-10 ENCOUNTER — TELEMEDICINE (OUTPATIENT)
Dept: FAMILY MEDICINE CLINIC | Facility: CLINIC | Age: 55
End: 2020-06-10

## 2020-06-10 VITALS — WEIGHT: 134 LBS | BODY MASS INDEX: 26.31 KG/M2 | HEIGHT: 60 IN

## 2020-06-10 DIAGNOSIS — E78.5 HYPERLIPIDEMIA, UNSPECIFIED HYPERLIPIDEMIA TYPE: Primary | ICD-10-CM

## 2020-06-10 DIAGNOSIS — R10.84 GENERALIZED ABDOMINAL PAIN: ICD-10-CM

## 2020-06-10 DIAGNOSIS — D72.10 EOSINOPHILIA: ICD-10-CM

## 2020-06-10 PROCEDURE — 99214 OFFICE O/P EST MOD 30 MIN: CPT | Performed by: FAMILY MEDICINE

## 2020-06-10 RX ORDER — CLOBETASOL PROPIONATE 0.5 MG/G
CREAM TOPICAL AS NEEDED
COMMUNITY
Start: 2020-05-15 | End: 2020-12-10

## 2020-06-10 NOTE — PROGRESS NOTES
Zi Jasso is a 54 y.o. female.     Chief Complaint   Patient presents with   • GI Problem     Patient is needing to discuss her stomach issues and review her lab results (pt is DOXI)       HPI     Pt here via video. 20 min spent. Consent given. Pt having increased abdominal pain. Pain is generalized and getting worse. Seems to be associated with food. Having constipation and some diarrhea. Labs from last office visit show continued elevated cholesterol. Has strong family hx. Labs also showed significant eosinophilia.    The following portions of the patient's history were reviewed and updated as appropriate: allergies, current medications, past family history, past medical history, past social history, past surgical history and problem list.    Review of Systems   Cardiovascular: Negative for chest pain, palpitations and leg swelling.   Gastrointestinal:        GI problem   All other systems reviewed and are negative.    I have reviewed the ROS as documented by the MA. Felipe Cedeno MD    Objective  There were no vitals filed for this visit.  Body mass index is 26.17 kg/m².    Physical Exam   Constitutional: She is oriented to person, place, and time. She appears well-developed and well-nourished. No distress.   Neurological: She is alert and oriented to person, place, and time.   Skin: She is not diaphoretic.   Psychiatric: She has a normal mood and affect. Her behavior is normal.   Nursing note and vitals reviewed.        Current Outpatient Medications:   •  azelastine (ASTELIN) 0.1 % nasal spray, , Disp: , Rfl:   •  Butalbital-APAP-Caffeine -40 MG per capsule, Take 1 capsule by mouth Every 4 (Four) Hours As Needed for headaches., Disp: , Rfl:   •  Cholecalciferol (VITAMIN D3) 5000 units capsule capsule, Take 5,000 Units by mouth Daily., Disp: , Rfl:   •  clobetasol (TEMOVATE) 0.05 % cream, , Disp: , Rfl:   •  levocetirizine (XYZAL) 5 MG tablet, Take 1 tablet by mouth Daily., Disp: 90 tablet, Rfl:  3  •  SYNTHROID 125 MCG tablet, Take 0.5 tablets by mouth Daily., Disp: 45 tablet, Rfl: 3  •  Evolocumab 140 MG/ML solution auto-injector, Inject 1 mL under the skin into the appropriate area as directed Every 14 (Fourteen) Days., Disp: 2.1 mL, Rfl: 1  Current outpatient and discharge medications have been reconciled for the patient.  Reviewed by: Felipe Cedeno MD      Procedures    Lab Results (most recent)     None                  Zi was seen today for gi problem.    Diagnoses and all orders for this visit:    Hyperlipidemia, unspecified hyperlipidemia type  -     Evolocumab 140 MG/ML solution auto-injector; Inject 1 mL under the skin into the appropriate area as directed Every 14 (Fourteen) Days.    Generalized abdominal pain  -     CT abdomen pelvis wo contrast; Future    Eosinophilia    Possible eosinophilia secondary to food allergy. This could be causing the pain. Will get CT scan. Previous scan from 2017 reviewed. Was normal. Advised that she avoid gluten and dairy for a couple weeks to see how that goes. Will also get her started on HLD therapy. She failed statins.       Return in about 2 weeks (around 6/24/2020) for Recheck.      Felipe Cedeno MD

## 2020-06-12 ENCOUNTER — TELEPHONE (OUTPATIENT)
Dept: FAMILY MEDICINE CLINIC | Facility: CLINIC | Age: 55
End: 2020-06-12

## 2020-06-12 NOTE — TELEPHONE ENCOUNTER
MAIK FROM [employer]) CALLED ON BEHALF OF THE [facility] ON [address] STATING THAT THEY FAXED A PRIOR AUTHORIZATION INITIATION REQUEST YESTERDAY (6/11/20) AROUND 11:15 AM TO DR. REILLY'S OFFICE FOR THE PATIENT'S PRESCRIPTION FOR THE EVOLOCUMAB 140 MG/ML SOLUTION AUTO-INJECTOR.     MAIK STATED THAT SHE WOULD FAX THIS FORM OVER AGAIN TODAY (6/12/20) JUST TO BE SURE THAT IT IS RECEIVED BY DR. REILLY.    MAIK STATED THAT THE FAX NUMBER FOR THE [facility] ON [address] [phone].    PLEASE CALL MAIK FROM [employer]) AT [phone] WHEN THIS MESSAGE HAS BEEN RECEIVED AND ADVISE. MAIK STATED THAT THE PRESCRIPTION NUMBER THAT THIS IS IN REFERENCE TO IS: #[id number].

## 2020-06-16 ENCOUNTER — OFFICE VISIT (OUTPATIENT)
Dept: GASTROENTEROLOGY | Facility: CLINIC | Age: 55
End: 2020-06-16

## 2020-06-16 ENCOUNTER — PRIOR AUTHORIZATION (OUTPATIENT)
Dept: FAMILY MEDICINE CLINIC | Facility: CLINIC | Age: 55
End: 2020-06-16

## 2020-06-16 VITALS — HEIGHT: 61 IN | BODY MASS INDEX: 25.11 KG/M2 | TEMPERATURE: 98.4 F | WEIGHT: 133 LBS

## 2020-06-16 DIAGNOSIS — R10.30 LOWER ABDOMINAL PAIN: ICD-10-CM

## 2020-06-16 DIAGNOSIS — R19.4 CHANGE IN BOWEL HABITS: ICD-10-CM

## 2020-06-16 DIAGNOSIS — R07.89 OTHER CHEST PAIN: ICD-10-CM

## 2020-06-16 DIAGNOSIS — R13.19 ESOPHAGEAL DYSPHAGIA: ICD-10-CM

## 2020-06-16 DIAGNOSIS — R12 HEARTBURN: Primary | ICD-10-CM

## 2020-06-16 DIAGNOSIS — R14.0 ABDOMINAL BLOATING: ICD-10-CM

## 2020-06-16 PROCEDURE — 99204 OFFICE O/P NEW MOD 45 MIN: CPT | Performed by: INTERNAL MEDICINE

## 2020-06-16 NOTE — PROGRESS NOTES
Chief Complaint   Patient presents with   • Abdominal Pain   • Change in bowels       Zi Jasso is a 54 y.o. female who presents with abdominal pain bloating change in bowel habit    54-year-old with 3 years of multiple intermittent GI complaints which include abdominal pain bloating mostly upper quadrants radiating into the lower quadrants, nausea occasional dysphagia, heartburn, no alarm symptoms such weight loss or rectal bleeding    Work-up has included CBC, CMP within normal limits  Work-up has included thyroid function testing and she does carry a diagnosis of Hashimoto's  CAT scan of the abdomen is pending for tomorrow  Her gallbladder is intact  EGD 2 years ago within normal limits with biopsies negative for EOE  H. pylori antibodies negative  Colonoscopy 3 years ago normal      Past Medical History:   Diagnosis Date   • Allergic    • Chronic headaches    • Fibrocystic breast    • Hyperlipidemia    • Hypothyroidism        Past Surgical History:   Procedure Laterality Date   • COLONOSCOPY  09/15/2017    William Cheadle, MD  negative per pt    • ENDOSCOPY N/A 10/12/2018    Procedure: ESOPHAGOGASTRODUODENOSCOPY WITH BIOPSY;  Surgeon: Jamin John MD;  Location: [facility] ENDOSCOPY;  Service: Gastroenterology   • EYE SURGERY           Current Outpatient Medications:   •  azelastine (ASTELIN) 0.1 % nasal spray, , Disp: , Rfl:   •  Butalbital-APAP-Caffeine -40 MG per capsule, Take 1 capsule by mouth As Needed for Headache., Disp: , Rfl:   •  Cholecalciferol (VITAMIN D3) 5000 units capsule capsule, Take 5,000 Units by mouth Daily., Disp: , Rfl:   •  clobetasol (TEMOVATE) 0.05 % cream, Apply  topically to the appropriate area as directed As Needed., Disp: , Rfl:   •  levocetirizine (XYZAL) 5 MG tablet, Take 1 tablet by mouth Daily., Disp: 90 tablet, Rfl: 3  •  SYNTHROID 125 MCG tablet, Take 0.5 tablets by mouth Daily., Disp: 45 tablet, Rfl: 3  •  Evolocumab 140 MG/ML solution auto-injector, Inject 1 mL  under the skin into the appropriate area as directed Every 14 (Fourteen) Days., Disp: 2.1 mL, Rfl: 1    Allergies   Allergen Reactions   • Nexium [Esomeprazole Magnesium] Other (See Comments) and Dizziness     Weakness     • Penicillins Rash       Social History     Socioeconomic History   • Marital status:      Spouse name: Not on file   • Number of children: Not on file   • Years of education: Not on file   • Highest education level: Not on file   Tobacco Use   • Smoking status: Never Smoker   • Smokeless tobacco: Never Used   Substance and Sexual Activity   • Alcohol use: Yes     Comment: Rare   • Drug use: No   • Sexual activity: Defer       Family History   Problem Relation Age of Onset   • Heart attack Father    • Cancer Maternal Aunt         breast and pancreatic cancer    • Breast cancer Maternal Aunt    • Pancreatic cancer Maternal Aunt    • Cancer Maternal Grandmother         pancreatic cancer     • Kidney failure Maternal Grandmother    • Cancer Paternal Grandmother         brain tumor    • Lymphoma Mother    • Lymphoma Maternal Grandfather        Review of Systems   Gastrointestinal: Positive for abdominal distention, abdominal pain, diarrhea and nausea.   All other systems reviewed and are negative.      Vitals:    06/16/20 1115   Temp: 98.4 °F (36.9 °C)       Physical Exam   Constitutional: She is oriented to person, place, and time. She appears well-developed and well-nourished.   HENT:   Head: Normocephalic and atraumatic.   Eyes: Pupils are equal, round, and reactive to light.   Cardiovascular: Normal rate, regular rhythm and normal heart sounds.   Pulmonary/Chest: Effort normal and breath sounds normal.   Abdominal: Soft. Bowel sounds are normal. She exhibits no shifting dullness, no distension, no pulsatile liver, no fluid wave, no abdominal bruit, no ascites, no pulsatile midline mass and no mass. There is no hepatosplenomegaly. There is no tenderness. There is no rigidity and no guarding.  No hernia.   Musculoskeletal: Normal range of motion.   Neurological: She is alert and oriented to person, place, and time.   Skin: Skin is warm and dry.   Psychiatric: She has a normal mood and affect. Her behavior is normal. Thought content normal.   Nursing note and vitals reviewed.    Problem list    Abdominal pain bloating  Nausea heartburn  Occasional dysphasia with globus  Change in bowel habits with alternating features    Assessment/Plan    Likely diagnosis is IBS am but celiac disease possibility and gallbladder dysfunction also possibility  Her mother has non-Hodgkin's lymphoma, and an unknown liver disease but they are working up in [place] at this point  She has multiple family members with pancreas cancer  I agree with CAT scan of the abdomen due for tomorrow if that is within normal limits I would move to ultrasound of the gallbladder along with HIDA scan    If that is unrevealing we will treat her for IBS

## 2020-06-17 ENCOUNTER — HOSPITAL ENCOUNTER (OUTPATIENT)
Dept: CT IMAGING | Facility: HOSPITAL | Age: 55
Discharge: HOME | End: 2020-06-17
Admitting: FAMILY MEDICINE

## 2020-06-17 DIAGNOSIS — R10.84 GENERALIZED ABDOMINAL PAIN: ICD-10-CM

## 2020-06-17 LAB
ENDOMYSIUM IGA SER QL: NEGATIVE
GLIADIN PEPTIDE IGA SER-ACNC: 6 UNITS (ref 0–19)
GLIADIN PEPTIDE IGG SER-ACNC: 3 UNITS (ref 0–19)
IGA SERPL-MCNC: 194 MG/DL (ref 87–352)
TTG IGA SER-ACNC: <2 U/ML (ref 0–3)
TTG IGG SER-ACNC: <2 U/ML (ref 0–5)

## 2020-06-17 PROCEDURE — 0 DIATRIZOATE MEGLUMINE & SODIUM PER 1 ML: Performed by: FAMILY MEDICINE

## 2020-06-17 PROCEDURE — 74176 CT ABD & PELVIS W/O CONTRAST: CPT

## 2020-06-17 RX ADMIN — DIATRIZOATE MEGLUMINE AND DIATRIZOATE SODIUM 30 ML: 660; 100 LIQUID ORAL; RECTAL at 14:00

## 2020-06-22 NOTE — PROGRESS NOTES
Celiac panel normal  Please schedule abdominal ultrasound with HIDA scan with CCK or oral Ensure  Thank you

## 2020-06-24 NOTE — TELEPHONE ENCOUNTER
Pharmacy called again asking if we are going to appeal this denial or try something else for the patient. Please advise.

## 2020-06-26 ENCOUNTER — TELEPHONE (OUTPATIENT)
Dept: FAMILY MEDICINE CLINIC | Facility: CLINIC | Age: 55
End: 2020-06-26

## 2020-06-26 NOTE — TELEPHONE ENCOUNTER
PATIENCE FROM [facility] CALLED ON BEHALF OF PT.  PATIENCE CALLED STATING THAT [facility] HAS DENIED APPEAL.  PATIENCE WANTS TO KNOW IF DR REILLY WILL BE DOING ANOTHER APPEAL FOR PT.    CALL BACK FOR PATIENCE  [phone] PRESCRIPTION NUMBER AS REFERENCE

## 2020-07-01 ENCOUNTER — TELEPHONE (OUTPATIENT)
Dept: GASTROENTEROLOGY | Facility: CLINIC | Age: 55
End: 2020-07-01

## 2020-07-01 DIAGNOSIS — R14.0 ABDOMINAL BLOATING: Primary | ICD-10-CM

## 2020-07-01 DIAGNOSIS — R10.11 RIGHT UPPER QUADRANT PAIN: ICD-10-CM

## 2020-07-01 NOTE — TELEPHONE ENCOUNTER
----- Message from Sushant Jimenez MD sent at 6/22/2020 10:27 AM EDT -----  Celiac panel normal  Please schedule abdominal ultrasound with HIDA scan with CCK or oral Ensure  Thank you

## 2020-07-15 ENCOUNTER — HOSPITAL ENCOUNTER (OUTPATIENT)
Dept: ULTRASOUND IMAGING | Facility: HOSPITAL | Age: 55
Discharge: HOME | End: 2020-07-15
Admitting: INTERNAL MEDICINE

## 2020-07-15 PROCEDURE — 76705 ECHO EXAM OF ABDOMEN: CPT

## 2020-07-16 ENCOUNTER — APPOINTMENT (OUTPATIENT)
Dept: WOMENS IMAGING | Facility: HOSPITAL | Age: 55
End: 2020-07-16

## 2020-07-16 PROCEDURE — 77063 BREAST TOMOSYNTHESIS BI: CPT | Performed by: RADIOLOGY

## 2020-07-16 PROCEDURE — 77067 SCR MAMMO BI INCL CAD: CPT | Performed by: RADIOLOGY

## 2020-07-16 NOTE — TELEPHONE ENCOUNTER
Called pt and advised of the notes from Dr Jimenez. Asked if anyone has reached out to schedule the HIDA scan yet? She states no, but she is not sure she wants to move forward with the HIDA scan yet. She had to pay a couple hundred dollars for the U/S and bills are stacking up. She thinks she may have IBS. Is there a test she can do to look for that? Is there any way Dr Jimenez could call her just for a minute or two to chat? Requested to cancel appt tomorrow with BG. Appt canceled per pt request. Note sent to BD per her request.

## 2020-07-17 ENCOUNTER — TELEPHONE (OUTPATIENT)
Dept: FAMILY MEDICINE CLINIC | Facility: CLINIC | Age: 55
End: 2020-07-17

## 2020-07-17 NOTE — TELEPHONE ENCOUNTER
The injection you ordered for patient to take for her cholesterol she said was denied by her insurance so she wants to know what you advise to do next?

## 2020-07-21 NOTE — TELEPHONE ENCOUNTER
Per Dr. 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

## 2020-07-21 NOTE — TELEPHONE ENCOUNTER
Patient called, advised as per Dr. Jimenez's note. She states she is currently out of town and will be back on Monday. Appointment scheduled for Monday with Dr. Jimenez at 1245.   Patient states she will purchase IBgard OTC.

## 2020-07-21 NOTE — TELEPHONE ENCOUNTER
Returned patient's phone call. She states she is still awaiting phone call from Dr. Jimenez. Advised will send an update to him.

## 2020-07-21 NOTE — TELEPHONE ENCOUNTER
Unfortunately if the insurance is not going to cover this medication our options are limited.  We can try cholesterol-lowering medications orally if the patient is interested.  They are both prescription and nonprescription options.  Please inquire as to the patient's desire to proceed.

## 2020-07-23 NOTE — TELEPHONE ENCOUNTER
Prescriptions include fish oil, and statins.  Non-prescriptions include over-the-counter fish oil preparations and was called red yeast rice.  If I were I would try the red yeast rice and over-the-counter omega-3 fish oil in the short-term.  Possibly in the future the medication will be approved.

## 2020-07-27 ENCOUNTER — OFFICE VISIT (OUTPATIENT)
Dept: GASTROENTEROLOGY | Facility: CLINIC | Age: 55
End: 2020-07-27

## 2020-07-27 VITALS — BODY MASS INDEX: 25.53 KG/M2 | WEIGHT: 135.2 LBS | HEIGHT: 61 IN

## 2020-07-27 DIAGNOSIS — R14.0 ABDOMINAL BLOATING: Primary | ICD-10-CM

## 2020-07-27 DIAGNOSIS — R10.11 RIGHT UPPER QUADRANT PAIN: ICD-10-CM

## 2020-07-27 DIAGNOSIS — R19.4 CHANGE IN BOWEL HABITS: ICD-10-CM

## 2020-07-27 DIAGNOSIS — R12 HEARTBURN: ICD-10-CM

## 2020-07-27 PROCEDURE — 99213 OFFICE O/P EST LOW 20 MIN: CPT | Performed by: INTERNAL MEDICINE

## 2020-07-27 NOTE — PROGRESS NOTES
Chief Complaint   Patient presents with   • Follow-up   • Diarrhea   • Heartburn   • Abdominal Pain       Zi Jasso is a  55 y.o. female here for a follow up visit for abdominal pain bloating change in bowel habits    Since her last visit she had a normal CAT scan and right upper quadrant ultrasound.  Her abdominal bloating epigastric pain right upper quadrant pain early satiety and change in bowel habits continues.  Working diagnoses at this point is IBS versus gallbladder dysfunction.    Work-up to date includes CAT scan, blood testing, right upper quadrant ultrasound, celiac panel, EGD colonoscopy all within normal limits      Past Medical History:   Diagnosis Date   • Allergic    • Chronic headaches    • Fibrocystic breast    • Hyperlipidemia    • Hypothyroidism        Past Surgical History:   Procedure Laterality Date   • COLONOSCOPY  09/15/2017    William Cheadle, MD  negative per pt    • ENDOSCOPY N/A 10/12/2018    Procedure: ESOPHAGOGASTRODUODENOSCOPY WITH BIOPSY;  Surgeon: Jamin John MD;  Location: [facility] ENDOSCOPY;  Service: Gastroenterology   • EYE SURGERY         Scheduled Meds:    Continuous Infusions:  No current facility-administered medications for this visit.     PRN Meds:.    Allergies   Allergen Reactions   • Nexium [Esomeprazole Magnesium] Other (See Comments) and Dizziness     Weakness     • Penicillins Rash       Social History     Socioeconomic History   • Marital status:      Spouse name: Not on file   • Number of children: Not on file   • Years of education: Not on file   • Highest education level: Not on file   Tobacco Use   • Smoking status: Never Smoker   • Smokeless tobacco: Never Used   Substance and Sexual Activity   • Alcohol use: Yes     Comment: Rare   • Drug use: No   • Sexual activity: Defer       Family History   Problem Relation Age of Onset   • Heart attack Father    • Cancer Maternal Aunt         breast and pancreatic cancer    • Breast cancer Maternal Aunt     • Pancreatic cancer Maternal Aunt    • Cancer Maternal Grandmother         pancreatic cancer     • Kidney failure Maternal Grandmother    • Cancer Paternal Grandmother         brain tumor    • Lymphoma Mother    • Lymphoma Maternal Grandfather        Review of Systems   Gastrointestinal: Positive for abdominal distention, abdominal pain, constipation, diarrhea and nausea.   All other systems reviewed and are negative.      Vitals:       Physical Exam   Constitutional: She is oriented to person, place, and time. She appears well-developed and well-nourished.   HENT:   Head: Normocephalic and atraumatic.   Eyes: Pupils are equal, round, and reactive to light.   Cardiovascular: Normal rate, regular rhythm and normal heart sounds.   Pulmonary/Chest: Effort normal and breath sounds normal.   Abdominal: Soft. Bowel sounds are normal. She exhibits no shifting dullness, no distension, no pulsatile liver, no fluid wave, no abdominal bruit, no ascites, no pulsatile midline mass and no mass. There is no hepatosplenomegaly. There is no tenderness. There is no rigidity and no guarding. No hernia.   Musculoskeletal: Normal range of motion.   Neurological: She is alert and oriented to person, place, and time.   Skin: Skin is warm and dry.   Psychiatric: She has a normal mood and affect. Her behavior is normal. Thought content normal.   Nursing note and vitals reviewed.      Problem list    Abdominal pain  Nausea  Bloating  Change in bowel habits      Assessment/Plan    IBS versus gallbladder dysfunction  Check HIDA scan with CCK  She can use FD guard for upper GI symptoms and IBgard for lower GI symptoms  If HIDA scan is abnormal we will ask for a general surgery opinion  If HIDA scan is normal we will treat with Xifaxan for 14 days  Continue the FD guard and IBgard as needed

## 2020-07-28 ENCOUNTER — TRANSCRIBE ORDERS (OUTPATIENT)
Dept: GASTROENTEROLOGY | Facility: CLINIC | Age: 55
End: 2020-07-28

## 2020-08-03 ENCOUNTER — HOSPITAL ENCOUNTER (OUTPATIENT)
Dept: NUCLEAR MEDICINE | Facility: HOSPITAL | Age: 55
Discharge: HOME | End: 2020-08-03

## 2020-08-03 PROCEDURE — 78227 HEPATOBIL SYST IMAGE W/DRUG: CPT

## 2020-08-03 PROCEDURE — 25010000002 SINCALIDE PER 5 MCG: Performed by: INTERNAL MEDICINE

## 2020-08-03 PROCEDURE — A9537 TC99M MEBROFENIN: HCPCS | Performed by: INTERNAL MEDICINE

## 2020-08-03 PROCEDURE — 0 TECHNETIUM TC 99M MEBROFENIN KIT: Performed by: INTERNAL MEDICINE

## 2020-08-03 RX ORDER — KIT FOR THE PREPARATION OF TECHNETIUM TC 99M MEBROFENIN 45 MG/10ML
1 INJECTION, POWDER, LYOPHILIZED, FOR SOLUTION INTRAVENOUS
Status: COMPLETED | OUTPATIENT
Start: 2020-08-03 | End: 2020-08-03

## 2020-08-03 RX ADMIN — SINCALIDE 1.2 MCG: 5 INJECTION, POWDER, LYOPHILIZED, FOR SOLUTION INTRAVENOUS at 09:47

## 2020-08-03 RX ADMIN — MEBROFENIN 1 DOSE: 45 INJECTION, POWDER, LYOPHILIZED, FOR SOLUTION INTRAVENOUS at 08:35

## 2020-08-04 ENCOUNTER — TELEPHONE (OUTPATIENT)
Dept: GASTROENTEROLOGY | Facility: CLINIC | Age: 55
End: 2020-08-04

## 2020-08-04 DIAGNOSIS — K82.8 DYSFUNCTIONAL GALLBLADDER: Primary | ICD-10-CM

## 2020-08-04 NOTE — TELEPHONE ENCOUNTER
----- Message from Sushant Jimenez MD sent at 8/4/2020 12:39 PM EDT -----  Gallbladder dysfunction  Please refer to [facility] Dr. Estrada or Dr. Wagner

## 2020-08-04 NOTE — TELEPHONE ENCOUNTER
Patient called. Advised as per Dr. Jimenez's note. She verb understanding and is in agreement with the plan.   Referral sent to [facility] via [employer].

## 2020-08-11 ENCOUNTER — TELEPHONE (OUTPATIENT)
Dept: GASTROENTEROLOGY | Facility: CLINIC | Age: 55
End: 2020-08-11

## 2020-08-11 ENCOUNTER — OFFICE VISIT (OUTPATIENT)
Dept: SURGERY | Facility: CLINIC | Age: 55
End: 2020-08-11

## 2020-08-11 VITALS — BODY MASS INDEX: 26 KG/M2 | HEIGHT: 60 IN | WEIGHT: 132.4 LBS

## 2020-08-11 DIAGNOSIS — K82.8 BILIARY DYSKINESIA: Primary | ICD-10-CM

## 2020-08-11 PROCEDURE — 99203 OFFICE O/P NEW LOW 30 MIN: CPT | Performed by: SURGERY

## 2020-08-11 NOTE — TELEPHONE ENCOUNTER
----- Message from Zi Jasso sent at 8/10/2020  5:53 PM EDT -----  Regarding: Visit Follow-Up Question  Contact: [phone]  Hi Dr. Jimenez,  I have an appointment tmrw with with Dr Estrada, where you referred me, to discuss the removal of my gallbladder. I understood from your call last week that my Hidascan showed my gallbladder ejection to be approximately 19%, which is low. Over the weekend I researched more about gallbladder symptoms and removal. Most of what I read discusses pain in the right to middle area of the chest/abdomen. About 3 years ago I started out with periodic discomfort in that area, however over time the periodic discomfort was also in my right side.     Continued in next message    Thank you,    Zi Jasso

## 2020-08-11 NOTE — TELEPHONE ENCOUNTER
----- Message from Zi Jasso sent at 8/10/2020  5:56 PM EDT -----  Regarding: Visit Follow-Up Question  Contact: [phone]  Most of what I read discuss pain in the right to middle area of the chest/abdomen. About 3 years ago I had periodic discomfort in that area, however over time the periodic discomfort included my right side. The last few months, I would describe the feeling as gnawing pain and it has been predominantly on my right side, with some in my whole chest as well as down my abdomen. A few times I have had pain across my lower back. This makes me wonder if maybe I may have a problem with something other my gallbladder?   I noticed on the 6/18/20 CT report that I have colonic diverticulosis. Could this be causing some/all of my problems?   Did the tests that I had rule out pancrease problems, Heart problems? Are there other recommended tests?    Zi Jasso

## 2020-08-11 NOTE — TELEPHONE ENCOUNTER
----- Message from Zi Jasso sent at 8/10/2020  8:10 PM EDT -----  Regarding: Visit Follow-Up Question  Contact: [phone]  I Meant Left Side    About 3 years ago I started out with periodic discomfort in that area, however over time the periodic discomfort was also in my LEFT side. The last few months, I would describe the feeling as gnawing pain and it has been predominantly on my LEFT side, as well as across my whole chest as well as down my abdomen. A few times I have had pain across my lower back. This makes me wonder if maybe I may have a problem with something other my gallbladder?   I noticed on the 6/18/2020 CT report that I have colonic diverticulosis. Could this be causing some/all of my problems?   Did the tests that I have rule out pancrease problems? Heart problems? Are there other recommended tests?  Thank you,

## 2020-08-12 NOTE — TELEPHONE ENCOUNTER
Office visit to discuss her concerns, and happy to see her Monday at the end of my clinic next week    thank you. BMD.    **Call to pt.  Advise of above.  Attempt overbook pt without success.  Message to Manager.      Pt states that Dr Estrada has recommended scopes.  Advise to discuss this with Dr Jimenez at upcoming visit.  Verb understanding.  Brittney Benites RN.

## 2020-08-17 ENCOUNTER — OFFICE VISIT (OUTPATIENT)
Dept: GASTROENTEROLOGY | Facility: CLINIC | Age: 55
End: 2020-08-17

## 2020-08-17 ENCOUNTER — TELEPHONE (OUTPATIENT)
Dept: ENDOCRINOLOGY | Age: 55
End: 2020-08-17

## 2020-08-17 VITALS — WEIGHT: 136 LBS | TEMPERATURE: 98 F | HEIGHT: 60 IN | BODY MASS INDEX: 26.7 KG/M2

## 2020-08-17 DIAGNOSIS — R10.11 RIGHT UPPER QUADRANT PAIN: Primary | ICD-10-CM

## 2020-08-17 PROCEDURE — 99213 OFFICE O/P EST LOW 20 MIN: CPT | Performed by: INTERNAL MEDICINE

## 2020-08-17 NOTE — PROGRESS NOTES
Reason for follow-up: Abdominal pain constipation change in bowel habits now with left-sided chest pain which has resolved    Zi Jasso is a  55 y.o. female here for a follow up visit for abdominal pain    55-year-old with right upper quadrant pain chronic HIDA scan 19%, she visited with general surgery who would like her to have scopes before her gallbladder is removed  She developed some left chest pain that did not radiate not worse with exertion, was not associated with dysphasia or eating it has resolved  She continues with IBS-C type symptoms of incomplete evacuation occasional infrequent bowel movements lower abdominal cramping    Her last colonoscopy was 3 years ago reportedly normal  EGD 2 years ago with Dr. John was reportedly normal    She had tried PPI in the past which caused dizziness  She is tried Pepcid in the past currently not on a PPI    She is responded positively to IBgard but FD guard offered her no benefit      Past Medical History:   Diagnosis Date   • Allergic    • Chronic headaches    • Fibrocystic breast    • Hyperlipidemia    • Hypothyroidism        Past Surgical History:   Procedure Laterality Date   • COLONOSCOPY  09/15/2017    William Cheadle, MD  negative per pt    • ENDOSCOPY N/A 10/12/2018    Procedure: ESOPHAGOGASTRODUODENOSCOPY WITH BIOPSY;  Surgeon: Jamin John MD;  Location: [facility] ENDOSCOPY;  Service: Gastroenterology   • EYE SURGERY         Scheduled Meds:    Continuous Infusions:  No current facility-administered medications for this visit.     PRN Meds:.    Allergies   Allergen Reactions   • Nexium [Esomeprazole Magnesium] Other (See Comments) and Dizziness     Weakness     • Penicillins Rash       Social History     Socioeconomic History   • Marital status:      Spouse name: Not on file   • Number of children: Not on file   • Years of education: Not on file   • Highest education level: Not on file   Tobacco Use   • Smoking status: Never Smoker   • Smokeless  tobacco: Never Used   Substance and Sexual Activity   • Alcohol use: Not Currently     Comment: Rare   • Drug use: No   • Sexual activity: Defer       Family History   Problem Relation Age of Onset   • Heart attack Father    • Cancer Maternal Aunt         breast and pancreatic cancer    • Breast cancer Maternal Aunt    • Pancreatic cancer Maternal Aunt    • Cancer Maternal Grandmother         pancreatic cancer     • Kidney failure Maternal Grandmother    • Cancer Paternal Grandmother         brain tumor    • Lymphoma Mother         Non Hodgkins   • Lymphoma Maternal Grandfather        Review of Systems   Gastrointestinal: Positive for abdominal distention, abdominal pain and constipation.   All other systems reviewed and are negative.      There were no vitals filed for this visit.    Physical Exam   Constitutional: She is oriented to person, place, and time. She appears well-developed and well-nourished.   HENT:   Head: Normocephalic and atraumatic.   Eyes: Pupils are equal, round, and reactive to light.   Cardiovascular: Normal rate, regular rhythm and normal heart sounds.   Pulmonary/Chest: Effort normal and breath sounds normal.   Abdominal: Soft. Bowel sounds are normal. She exhibits no shifting dullness, no distension, no pulsatile liver, no fluid wave, no abdominal bruit, no ascites, no pulsatile midline mass and no mass. There is no hepatosplenomegaly. There is no tenderness. There is no rigidity and no guarding. No hernia.   Musculoskeletal: Normal range of motion.   Neurological: She is alert and oriented to person, place, and time.   Skin: Skin is warm and dry.   Psychiatric: She has a normal mood and affect. Her behavior is normal. Thought content normal.   Nursing note and vitals reviewed.      Problem list    Right upper quadrant pain  Left chest discomfort that has resolved  Abnormal HIDA scan  Constipation  IBS-C suspected      Assessment/Plan    I do not have a general surgery note reviewed but the  general surgeon told the patient that scope should be updated before cholecystectomy  Schedule EGD and colonoscopy for the above symptoms  Continue IBgard as needed  Begin a probiotic, align 1 capsule /day  If EGD and colonoscopy are within normal limits without a source for her abdominal pain found then I would treat her with Amitiza 8 mcg p.o. twice daily for IBS-C, if her symptoms continue consider cholecystectomy

## 2020-08-18 DIAGNOSIS — E03.9 ADULT HYPOTHYROIDISM: ICD-10-CM

## 2020-08-18 DIAGNOSIS — E06.3 HASHIMOTO'S DISEASE: Primary | ICD-10-CM

## 2020-08-18 DIAGNOSIS — E55.9 VITAMIN D DEFICIENCY: ICD-10-CM

## 2020-08-18 DIAGNOSIS — R68.89 ABNORMAL ENDOCRINE LABORATORY TEST FINDING: ICD-10-CM

## 2020-08-18 DIAGNOSIS — E78.5 HYPERLIPIDEMIA, UNSPECIFIED HYPERLIPIDEMIA TYPE: ICD-10-CM

## 2020-08-19 ENCOUNTER — LAB REQUISITION (OUTPATIENT)
Dept: LAB | Facility: HOSPITAL | Age: 55
End: 2020-08-19

## 2020-08-19 DIAGNOSIS — E03.9 ADULT HYPOTHYROIDISM: ICD-10-CM

## 2020-08-19 DIAGNOSIS — Z00.00 ENCOUNTER FOR GENERAL ADULT MEDICAL EXAMINATION WITHOUT ABNORMAL FINDINGS: ICD-10-CM

## 2020-08-19 DIAGNOSIS — E55.9 VITAMIN D DEFICIENCY: ICD-10-CM

## 2020-08-19 DIAGNOSIS — E06.3 HASHIMOTO'S DISEASE: ICD-10-CM

## 2020-08-19 DIAGNOSIS — E78.5 HYPERLIPIDEMIA, UNSPECIFIED HYPERLIPIDEMIA TYPE: ICD-10-CM

## 2020-08-19 PROCEDURE — U0004 COV-19 TEST NON-CDC HGH THRU: HCPCS | Performed by: INTERNAL MEDICINE

## 2020-08-19 NOTE — PROGRESS NOTES
General Surgery  Initial Office Visit    CC: Abnormal gallbladder imaging, chest pain    HPI: The patient is a pleasant 55 y.o. year-old lady who presents today for evaluation of intermittent chest pain that has occurred since 2017.  She says initially the pain was located along the right side of her chest but now involves more of her left inframammary chest deep to her breast with some radiation to her sternum in the midline.  She says that the chest pains will come on sporadically with no clear causative factors and nothing seems to make the pain better or worse.  She denies any associated nausea or vomiting and denies any right upper quadrant abdominal pain.  She has had no change in her bowel habits.  She reports some chronic constipation issues but denies any diarrhea or postprandial abdominal bloating.  She has seen Dr. Jimenez in the past for work-up of presumed constipation predominant IBS.  He ordered a gallbladder ultrasound which was done in mid July of this year and showed no significant abnormalities.  He then ordered a HIDA scan which revealed a low gallbladder ejection fraction of only 19%.  He then referred her to see me to discuss whether cholecystectomy would be prudent.  She also had a CT abdomen/pelvis done in June of this year which was within normal limits.    Past Medical History:   Hyperlipidemia  Hypothyroidism  Migraine headaches  GERD    Past Surgical History:   EGD (October 2018, Dr. John - acute on chronic esophageal inflammation negative for fungi)  Colonoscopy (?3 years ago)    Medications:   Levocetirizine 5 mg daily  Levothyroxine 125 mcg daily  Azelastine nasal spray  Vitamin D3 5000 units daily  Clobetasol 0.05% cream    Allergies: Nexium (dizziness), penicillin (rash)    Family History: Mother with history of non-Hodgkin's lymphoma, no family history of gallbladder pathology    Social History: , works in sales, non-smoker, no regular alcohol use    ROS:   Constitutional:  Negative for fevers or chills  HENT: Negative for hearing loss or runny nose  Eyes: Negative for vision changes or scleral icterus  Respiratory: Negative for cough or shortness of breath  Cardiovascular: Positive for chest pain; negative for leg swelling or heart palpitations  Gastrointestinal: Positive for constipation; negative for abdominal pain, nausea, vomiting, diarrhea, melena, or hematochezia  Genitourinary: Negative for hematuria or dysuria  Musculoskeletal: Negative for joint swelling or gait instability  Neurologic: Negative for tremors or seizures  Psychiatric: Negative for suicidal ideations or depression  All other systems reviewed and negative    Physical Exam:  Height: 152 cm  Weight: 60 kg  BMI: 25.9  General: No acute distress, well-nourished & well-developed  HEAD: normocephalic, atraumatic  EYES: normal conjunctiva, sclera anicteric  EARS: grossly normal hearing  NECK: supple, no thyromegaly  CARDIOVASCULAR: regular rate and rhythm  RESPIRATORY: clear to auscultation bilaterally  GASTROINTESTINAL: soft, nontender, non-distended  MUSCULOSKELETAL: normal gait and station. No gross extremity abnormalities  PSYCHIATRIC: oriented x3, normal mood and affect    IMAGING:  GALLBLADDER ULTRASOUND (July 15, 2020):  FINDINGS: Sonographic evaluation of the structures of the right upper quadrant was performed. Comparison is made to a prior CT of the abdomen dated 06/17/2020. The right kidney appears normal. The liver has a normal appearance. The gallbladder appears normal. The common bile duct 0.5 cm in diameter. The visualized portion of the pancreas appears normal. Per the sonographer, the patient was nontender over the right upper quadrant during the examination.  IMPRESSION:  Negative gallbladder sonogram.    HIDA SCAN (August 2020):  IMPRESSION:  No evidence for cystic or common duct obstruction.  Low gallbladder ejection fraction of 19%. The normal lower limits are 35%.    CT ABD/PELVIS (June  2020):  IMPRESSION:  No acute abnormality within the abdomen and pelvis.    ASSESSMENT & PLAN  Mrs. Jasso is a 55-year-old lady with incidentally discovered biliary dyskinesia.  I reviewed her HIDA scan, gallbladder ultrasound, and CT scan done earlier this year.  I showed her the CT imaging and discussed the results of the other 2 imaging tests today.  At this time, she adamantly refuses any postprandial abdominal pain, nausea, or vomiting.  The symptoms she specifically emphasizes are most troublesome for her are left sided chest discomfort deep to her breast tissue, which are not related to her biliary dyskinesia in any way.  I discussed with her the most common symptoms associated with biliary dyskinesia and these include postprandial abdominal bloating, abdominal pain, nausea, vomiting, and acid reflux.  She again denies any of these being a chronic issue for her.  I advised her that eventually, her gallbladder will become more symptomatic given the low ejection fraction and she should call me back if and when she develops any of the aforementioned symptoms.  At that point, it would be prudent to discuss possible cholecystectomy.  Until then, cholecystectomy would offer her little to no benefit and would not have any positive effect on her chest discomfort.  She expressed understanding and will follow-up with me as needed.    Soni Estrada MD  General and Endoscopic Surgery  [facility], [place], operated by [facility]    [address], [address]  [place], [place], [address]  P: [phone]  F: [phone]

## 2020-08-20 ENCOUNTER — TELEPHONE (OUTPATIENT)
Dept: SURGERY | Facility: CLINIC | Age: 55
End: 2020-08-20

## 2020-08-20 LAB
25(OH)D3+25(OH)D2 SERPL-MCNC: 34 NG/ML (ref 30–100)
CHOLEST SERPL-MCNC: 271 MG/DL (ref 100–199)
HDLC SERPL-MCNC: 61 MG/DL
INTERPRETATION: NORMAL
LDLC SERPL CALC-MCNC: 192 MG/DL (ref 0–99)
REF LAB TEST METHOD: NORMAL
SARS-COV-2 RNA RESP QL NAA+PROBE: NOT DETECTED
T4 FREE SERPL-MCNC: 1.14 NG/DL (ref 0.82–1.77)
TRIGL SERPL-MCNC: 90 MG/DL (ref 0–149)
TSH SERPL DL<=0.005 MIU/L-ACNC: 3.21 UIU/ML (ref 0.45–4.5)
VLDLC SERPL CALC-MCNC: 18 MG/DL (ref 5–40)

## 2020-08-23 ENCOUNTER — RESULTS ENCOUNTER (OUTPATIENT)
Dept: ENDOCRINOLOGY | Age: 55
End: 2020-08-23

## 2020-08-23 DIAGNOSIS — R68.89 ABNORMAL ENDOCRINE LABORATORY TEST FINDING: ICD-10-CM

## 2020-08-26 ENCOUNTER — OUTSIDE FACILITY SERVICE (OUTPATIENT)
Dept: GASTROENTEROLOGY | Facility: CLINIC | Age: 55
End: 2020-08-26

## 2020-08-26 ENCOUNTER — TELEPHONE (OUTPATIENT)
Dept: GASTROENTEROLOGY | Facility: CLINIC | Age: 55
End: 2020-08-26

## 2020-08-26 PROCEDURE — 43239 EGD BIOPSY SINGLE/MULTIPLE: CPT | Performed by: INTERNAL MEDICINE

## 2020-08-26 PROCEDURE — 45380 COLONOSCOPY AND BIOPSY: CPT | Performed by: INTERNAL MEDICINE

## 2020-08-26 RX ORDER — LUBIPROSTONE 8 UG/1
8 CAPSULE ORAL 2 TIMES DAILY WITH MEALS
Qty: 60 CAPSULE | Refills: 2 | Status: SHIPPED | OUTPATIENT
Start: 2020-08-26 | End: 2020-12-10

## 2020-08-26 NOTE — TELEPHONE ENCOUNTER
----- Message from Sushant Jimenez MD sent at 8/26/2020 11:24 AM EDT -----  Regarding: rx  Call in [facility] 8mcg po BID #[phone] rf

## 2020-09-02 ENCOUNTER — TELEMEDICINE (OUTPATIENT)
Dept: ENDOCRINOLOGY | Age: 55
End: 2020-09-02

## 2020-09-02 DIAGNOSIS — E03.9 ADULT HYPOTHYROIDISM: ICD-10-CM

## 2020-09-02 DIAGNOSIS — E78.5 HYPERLIPIDEMIA, UNSPECIFIED HYPERLIPIDEMIA TYPE: Primary | ICD-10-CM

## 2020-09-02 PROCEDURE — 99214 OFFICE O/P EST MOD 30 MIN: CPT | Performed by: INTERNAL MEDICINE

## 2020-09-02 RX ORDER — PRAVASTATIN SODIUM 10 MG
TABLET ORAL
Qty: 30 TABLET | Refills: 6 | Status: SHIPPED | OUTPATIENT
Start: 2020-09-02 | End: 2020-12-10

## 2020-09-04 ENCOUNTER — TELEPHONE (OUTPATIENT)
Dept: GASTROENTEROLOGY | Facility: CLINIC | Age: 55
End: 2020-09-04

## 2020-09-04 DIAGNOSIS — K52.9 COLITIS: Primary | ICD-10-CM

## 2020-09-04 DIAGNOSIS — R19.7 DIARRHEA, UNSPECIFIED TYPE: ICD-10-CM

## 2020-09-04 NOTE — TELEPHONE ENCOUNTER
----- Message from Lorenza Higginbotham sent at 9/3/2020  2:17 PM EDT -----  Regarding: Next Step/Results  Contact: [phone]  Pt states that Dr. Jimenez said he would call patient with results from recent procedure and that someone in the office would reach out to her for a f/u appointment. She would like to know if those results are in? And if she needs to schedule that f/u now or wait til she receives the results and go from there?

## 2020-09-09 NOTE — TELEPHONE ENCOUNTER
"Per Dr Jimenez: \"Biopsies of colon showing med side effect versus infection versus inflammatory bowel disease   Please obtain [facility] Crohn's disease diagnostic testing from [facility]   Office visits with me in October on a Tuesday 1215 when I am short call\"   "

## 2020-09-09 NOTE — TELEPHONE ENCOUNTER
Called pt and advised of the note from Dr Jimenez. She verb understanding and is in agreement with the plan. Attempted to make appt in October. Appt made for 9/29 so pt did not have to wait until the end of Oct.

## 2020-09-10 ENCOUNTER — LAB (OUTPATIENT)
Dept: LAB | Facility: HOSPITAL | Age: 55
End: 2020-09-10

## 2020-09-10 DIAGNOSIS — K52.9 COLITIS: ICD-10-CM

## 2020-09-10 DIAGNOSIS — R19.7 DIARRHEA, UNSPECIFIED TYPE: ICD-10-CM

## 2020-09-10 PROCEDURE — 36415 COLL VENOUS BLD VENIPUNCTURE: CPT

## 2020-09-17 LAB — REF LAB TEST METHOD: NORMAL

## 2020-09-25 ENCOUNTER — TELEPHONE (OUTPATIENT)
Dept: GASTROENTEROLOGY | Facility: CLINIC | Age: 55
End: 2020-09-25

## 2020-09-25 NOTE — PROGRESS NOTES
Blood test confirms no inflammatory bowel disease, NO Crohn's or ulcerative colitis  Follow-up next week as scheduled for discussion about these results

## 2020-09-25 NOTE — TELEPHONE ENCOUNTER
----- Message from Sushant Jimenez MD sent at 9/25/2020 10:09 AM EDT -----  Blood test confirms no inflammatory bowel disease, NO Crohn's or ulcerative colitis  Follow-up next week as scheduled for discussion about these results

## 2020-09-29 ENCOUNTER — TELEPHONE (OUTPATIENT)
Dept: GASTROENTEROLOGY | Facility: CLINIC | Age: 55
End: 2020-09-29

## 2020-09-29 ENCOUNTER — OFFICE VISIT (OUTPATIENT)
Dept: GASTROENTEROLOGY | Facility: CLINIC | Age: 55
End: 2020-09-29

## 2020-09-29 VITALS — WEIGHT: 129 LBS | BODY MASS INDEX: 25.32 KG/M2 | HEIGHT: 60 IN

## 2020-09-29 DIAGNOSIS — K21.9 GASTROESOPHAGEAL REFLUX DISEASE WITHOUT ESOPHAGITIS: ICD-10-CM

## 2020-09-29 DIAGNOSIS — R10.30 LOWER ABDOMINAL PAIN: ICD-10-CM

## 2020-09-29 DIAGNOSIS — K59.01 SLOW TRANSIT CONSTIPATION: ICD-10-CM

## 2020-09-29 DIAGNOSIS — R07.89 OTHER CHEST PAIN: ICD-10-CM

## 2020-09-29 DIAGNOSIS — R14.0 ABDOMINAL BLOATING: ICD-10-CM

## 2020-09-29 DIAGNOSIS — R19.4 CHANGE IN BOWEL HABITS: ICD-10-CM

## 2020-09-29 DIAGNOSIS — R12 HEARTBURN: Primary | ICD-10-CM

## 2020-09-29 DIAGNOSIS — R10.11 RIGHT UPPER QUADRANT PAIN: ICD-10-CM

## 2020-09-29 PROCEDURE — 99443 PR PHYS/QHP TELEPHONE EVALUATION 21-30 MIN: CPT | Performed by: INTERNAL MEDICINE

## 2020-09-29 RX ORDER — LUBIPROSTONE 8 UG/1
8 CAPSULE ORAL 2 TIMES DAILY WITH MEALS
Qty: 60 CAPSULE | Refills: 12 | Status: SHIPPED | OUTPATIENT
Start: 2020-09-29 | End: 2020-12-10

## 2020-09-29 RX ORDER — FAMOTIDINE 20 MG/1
20 TABLET, FILM COATED ORAL 2 TIMES DAILY
Qty: 60 TABLET | Refills: 12 | Status: SHIPPED | OUTPATIENT
Start: 2020-09-29 | End: 2020-12-10

## 2020-09-29 RX ORDER — ESOMEPRAZOLE MAGNESIUM 40 MG/1
40 CAPSULE, DELAYED RELEASE ORAL DAILY PRN
COMMUNITY
End: 2021-03-22

## 2020-09-29 NOTE — PROGRESS NOTES
Chief Complaint   Patient presents with   • Abdominal Pain   • Constipation   • upper epi gastric pain       Zi Jasso is a  55 y.o. female here for a follow up visit for irritable bowel syndrome constipation abdominal pain and GERD with atypical chest pain  This visit has been rescheduled as a phone visit to comply with patient safety concerns in accordance with CDC recommendations. Total time of discussion was 30 minutes.  You have chosen to receive care through a telephone visit. Do you consent to use a telephone visit for your medical care today? Yes    Since her last visit to the office she had an EGD and colonoscopy, those were within normal limits but biopsies of the colon randomly showed IBD versus med side effect with colitis, I performed [facility] IBD panel which was negative for Crohn's or ulcerative colitis    Since our visit she has tried Nexium over-the-counter with good relief of her GERD in April typical chest pain but it caused mouth sores and ulcers, leg cramping abdominal bloating and pain and she stopped the Nexium    Since our last visit she is also started an over-the-counter laxative which is helping with abdominal pain bloating and constipation    She has no alarm symptoms such as rectal bleeding or weight loss or vomiting      Past Medical History:   Diagnosis Date   • Allergic    • Chronic headaches    • Fibrocystic breast    • Hyperlipidemia    • Hypothyroidism        Past Surgical History:   Procedure Laterality Date   • COLONOSCOPY  09/15/2017    William Cheadle, MD  negative per pt    • COLONOSCOPY  08/26/2020    Tony NG   • ENDOSCOPY N/A 10/12/2018    Procedure: ESOPHAGOGASTRODUODENOSCOPY WITH BIOPSY;  Surgeon: Jamin John MD;  Location: [facility] ENDOSCOPY;  Service: Gastroenterology   • EYE SURGERY     • UPPER GASTROINTESTINAL ENDOSCOPY  08/26/2020    Tony NG       Scheduled Meds:    Continuous Infusions:No current facility-administered medications for this visit.        PRN Meds:.    Allergies   Allergen Reactions   • Penicillins Rash       Social History     Socioeconomic History   • Marital status:      Spouse name: Not on file   • Number of children: Not on file   • Years of education: Not on file   • Highest education level: Not on file   Tobacco Use   • Smoking status: Never Smoker   • Smokeless tobacco: Never Used   Substance and Sexual Activity   • Alcohol use: Not Currently   • Drug use: No   • Sexual activity: Defer       Family History   Problem Relation Age of Onset   • Heart attack Father    • Cancer Maternal Aunt         breast and pancreatic cancer    • Breast cancer Maternal Aunt    • Pancreatic cancer Maternal Aunt    • Cancer Maternal Grandmother         pancreatic cancer     • Kidney failure Maternal Grandmother    • Pancreatic cancer Maternal Grandmother    • Cancer Paternal Grandmother         brain tumor    • Lymphoma Mother         Non Hodgkins   • Lymphoma Maternal Grandfather        Review of Systems   Gastrointestinal: Positive for abdominal distention, abdominal pain, constipation and nausea.   All other systems reviewed and are negative.      There were no vitals filed for this visit.    Physical Exam   Constitutional: She appears well-developed and well-nourished. No distress.   Pulmonary/Chest: Effort normal. No stridor.  No respiratory distress. She no audible wheeze...She exhibits no tenderness.   Abdominal: She exhibits no visible mass.   Musculoskeletal: Normal range of motion.   Neurological: She is alert.   Psychiatric: She has a normal mood and affect. She mood appears normal. Her affect is normal. Her behavior is normal. Thought content is normal. She does not express abnormal judgement.     Problem list    Atypical chest pain/esophageal spasm suspected/small hiatal hernia/GERD/intolerant to PPI due to side effect  IBS-C based on symptoms  Abnormal HIDA scan      Assessment/Plan    Stop Nexium  Stop over-the-counter [place]  laxative  Begin Amitiza 8 mcg p.o. twice daily  Begin Pepcid 20 mg p.o. twice daily  Hold on cholecystectomy for the present time  Office visit 6 weeks

## 2020-09-29 NOTE — TELEPHONE ENCOUNTER
Returned pt's call and pt reports that she just spoke with Elissa and already has her appt today turned into a phone visit.

## 2020-10-14 ENCOUNTER — RESULTS ENCOUNTER (OUTPATIENT)
Dept: ENDOCRINOLOGY | Age: 55
End: 2020-10-14

## 2020-10-14 DIAGNOSIS — E78.5 HYPERLIPIDEMIA, UNSPECIFIED HYPERLIPIDEMIA TYPE: ICD-10-CM

## 2020-10-14 DIAGNOSIS — E03.9 ADULT HYPOTHYROIDISM: ICD-10-CM

## 2020-10-17 LAB
ALBUMIN SERPL-MCNC: 4.4 G/DL (ref 3.8–4.9)
ALBUMIN/GLOB SERPL: 1.8 {RATIO} (ref 1.2–2.2)
ALP SERPL-CCNC: 113 IU/L (ref 39–117)
ALT SERPL-CCNC: 42 IU/L (ref 0–32)
AST SERPL-CCNC: 41 IU/L (ref 0–40)
BILIRUB SERPL-MCNC: 1.2 MG/DL (ref 0–1.2)
BUN SERPL-MCNC: 9 MG/DL (ref 6–24)
BUN/CREAT SERPL: 11 (ref 9–23)
CALCIUM SERPL-MCNC: 9.8 MG/DL (ref 8.7–10.2)
CHLORIDE SERPL-SCNC: 102 MMOL/L (ref 96–106)
CHOLEST SERPL-MCNC: 270 MG/DL (ref 100–199)
CO2 SERPL-SCNC: 25 MMOL/L (ref 20–29)
CREAT SERPL-MCNC: 0.84 MG/DL (ref 0.57–1)
GLOBULIN SER CALC-MCNC: 2.5 G/DL (ref 1.5–4.5)
GLUCOSE SERPL-MCNC: 92 MG/DL (ref 65–99)
HDLC SERPL-MCNC: 64 MG/DL
INTERPRETATION: NORMAL
LDLC SERPL CALC-MCNC: 193 MG/DL (ref 0–99)
POTASSIUM SERPL-SCNC: 4.4 MMOL/L (ref 3.5–5.2)
PROT SERPL-MCNC: 6.9 G/DL (ref 6–8.5)
SODIUM SERPL-SCNC: 139 MMOL/L (ref 134–144)
TRIGL SERPL-MCNC: 79 MG/DL (ref 0–149)
VLDLC SERPL CALC-MCNC: 13 MG/DL (ref 5–40)

## 2020-10-28 DIAGNOSIS — R68.89 ABNORMAL ENDOCRINE LABORATORY TEST FINDING: ICD-10-CM

## 2020-10-28 DIAGNOSIS — E78.5 HYPERLIPIDEMIA, UNSPECIFIED HYPERLIPIDEMIA TYPE: Primary | ICD-10-CM

## 2020-10-28 DIAGNOSIS — E03.9 ADULT HYPOTHYROIDISM: ICD-10-CM

## 2020-11-02 ENCOUNTER — RESULTS ENCOUNTER (OUTPATIENT)
Dept: ENDOCRINOLOGY | Age: 55
End: 2020-11-02

## 2020-11-02 DIAGNOSIS — E78.5 HYPERLIPIDEMIA, UNSPECIFIED HYPERLIPIDEMIA TYPE: ICD-10-CM

## 2020-11-02 DIAGNOSIS — E03.9 ADULT HYPOTHYROIDISM: ICD-10-CM

## 2020-11-02 DIAGNOSIS — R68.89 ABNORMAL ENDOCRINE LABORATORY TEST FINDING: ICD-10-CM

## 2020-11-28 ENCOUNTER — RESULTS ENCOUNTER (OUTPATIENT)
Dept: ENDOCRINOLOGY | Age: 55
End: 2020-11-28

## 2020-11-28 DIAGNOSIS — E03.9 ADULT HYPOTHYROIDISM: ICD-10-CM

## 2020-12-06 NOTE — TELEPHONE ENCOUNTER
I spoke with Dr. Jimenez today.  
Pt called stated that Dr. Sushant Jimenez has been attempting to reach out to you.   Please contact Dr. Jimenez regarding the patient when possible.   Thanks  
Pt. presents to intake room 9, A&OX4, ambulatory. Pt. c/o left-sided flank pain, lower back pain, and groin pain. Pt. denies any fevers, chills, SOB, dizziness, or weakness. Pt. endorses two incidences of vomiting today. Pt. denies any significant past medical history. Respirations even and unlabored. 20 G IV established to L AC. labs drawn and sent. Will continue ot monitor.

## 2020-12-10 ENCOUNTER — LAB (OUTPATIENT)
Dept: LAB | Facility: HOSPITAL | Age: 55
End: 2020-12-10

## 2020-12-10 ENCOUNTER — OFFICE VISIT (OUTPATIENT)
Dept: INTERNAL MEDICINE | Facility: CLINIC | Age: 55
End: 2020-12-10

## 2020-12-10 VITALS
DIASTOLIC BLOOD PRESSURE: 70 MMHG | SYSTOLIC BLOOD PRESSURE: 100 MMHG | HEART RATE: 70 BPM | OXYGEN SATURATION: 99 % | WEIGHT: 138.8 LBS | BODY MASS INDEX: 27.25 KG/M2 | HEIGHT: 60 IN

## 2020-12-10 DIAGNOSIS — R07.89 OTHER CHEST PAIN: Primary | ICD-10-CM

## 2020-12-10 DIAGNOSIS — R11.0 NAUSEA: ICD-10-CM

## 2020-12-10 DIAGNOSIS — K21.9 GASTROESOPHAGEAL REFLUX DISEASE WITHOUT ESOPHAGITIS: ICD-10-CM

## 2020-12-10 DIAGNOSIS — Z00.00 HEALTHCARE MAINTENANCE: ICD-10-CM

## 2020-12-10 DIAGNOSIS — E78.5 HYPERLIPIDEMIA, UNSPECIFIED HYPERLIPIDEMIA TYPE: ICD-10-CM

## 2020-12-10 LAB
CRP SERPL-MCNC: 0.05 MG/DL (ref 0–0.5)
HCV AB SER DONR QL: NORMAL

## 2020-12-10 PROCEDURE — 36415 COLL VENOUS BLD VENIPUNCTURE: CPT | Performed by: FAMILY MEDICINE

## 2020-12-10 PROCEDURE — 99396 PREV VISIT EST AGE 40-64: CPT | Performed by: FAMILY MEDICINE

## 2020-12-10 PROCEDURE — 86803 HEPATITIS C AB TEST: CPT | Performed by: FAMILY MEDICINE

## 2020-12-10 PROCEDURE — 99214 OFFICE O/P EST MOD 30 MIN: CPT | Performed by: FAMILY MEDICINE

## 2020-12-10 PROCEDURE — 86140 C-REACTIVE PROTEIN: CPT | Performed by: FAMILY MEDICINE

## 2020-12-10 RX ORDER — CITALOPRAM 10 MG/1
10 TABLET ORAL DAILY
Qty: 30 TABLET | Refills: 3 | Status: SHIPPED | OUTPATIENT
Start: 2020-12-10 | End: 2021-03-22

## 2020-12-10 NOTE — PROGRESS NOTES
Subjective   Zi Jasso is a 55 y.o. female.     Chief Complaint   Patient presents with   • transferring from [facility] M.D.   • Annual Exam         History of Present Illness   Zi Jasso 55 y.o. female who presents for an Annual Wellness Visit.  she has a history of   Patient Active Problem List   Diagnosis   • Adult hypothyroidism   • Environmental allergies   • Migraine without status migrainosus, not intractable   • Gastroesophageal reflux disease without esophagitis   • Chest pain   • Hyperlipidemia   • Urinary tract infection with hematuria   • Nausea   • Constipation   • Iatrogenic hyperthyroidism   • Hashimoto's disease   • Chronic fatigue   • Esophageal dysphagia   • Heartburn   • Other chest pain   • Dizziness   • Healthcare maintenance   .  she has been feeling fairly well.  Labs results discussed in detail with the patient.  Plan to update vaccines if needed today.  I  reviewed health maintenance with her as part of my preventative care plan.    Health Habits:  Dental Exam. up to date  Eye Exam. up to date  Exercise: 0 times/week.  Current exercise activities include: none      The following portions of the patient's history were reviewed and updated as appropriate: allergies, current medications, past family history, past medical history, past social history, past surgical history and problem list.    Review of Systems   Constitutional: Negative.    HENT: Negative.    Respiratory: Positive for chest tightness.    Cardiovascular: Positive for chest pain.   Gastrointestinal: Positive for abdominal pain.   Endocrine: Negative.    Genitourinary: Negative.    Musculoskeletal: Negative.    Skin: Negative.    Allergic/Immunologic: Negative.    Neurological: Negative.    Hematological: Negative.    Psychiatric/Behavioral: Positive for sleep disturbance. Negative for agitation, behavioral problems, confusion, decreased concentration and dysphoric mood. The patient is nervous/anxious.        Objective    Physical Exam  Vitals signs reviewed.   Constitutional:       Appearance: Normal appearance. She is normal weight.   HENT:      Head: Normocephalic and atraumatic.      Right Ear: Tympanic membrane, ear canal and external ear normal.      Left Ear: Tympanic membrane, ear canal and external ear normal.   Eyes:      General: No scleral icterus.     Extraocular Movements: Extraocular movements intact.      Pupils: Pupils are equal, round, and reactive to light.   Neck:      Musculoskeletal: Normal range of motion.      Vascular: No carotid bruit.   Cardiovascular:      Rate and Rhythm: Normal rate and regular rhythm.      Pulses: Normal pulses.      Heart sounds: Normal heart sounds.   Pulmonary:      Effort: Pulmonary effort is normal.      Breath sounds: Normal breath sounds.   Abdominal:      Tenderness: There is no abdominal tenderness. There is no right CVA tenderness.   Musculoskeletal:      Right lower leg: No edema.      Left lower leg: No edema.   Skin:     General: Skin is warm and dry.      Coloration: Skin is not jaundiced.   Neurological:      General: No focal deficit present.      Mental Status: She is alert.      Sensory: No sensory deficit.      Motor: No weakness.      Gait: Gait normal.   Psychiatric:         Mood and Affect: Mood normal.         Behavior: Behavior normal.         Thought Content: Thought content normal.         Judgment: Judgment normal.         Assessment/Plan   Diagnoses and all orders for this visit:    1. Other chest pain (Primary)    2. Nausea    3. Gastroesophageal reflux disease without esophagitis    4. Healthcare maintenance  -     Hepatitis C Antibody  -     C-reactive protein    5. Hyperlipidemia, unspecified hyperlipidemia type  -     C-reactive protein    Other orders  -     Cancel: Mammo Screening Bilateral With CAD; Future  -     Cancel: Lipid Panel  -     Cancel: Comprehensive Metabolic Panel  -     citalopram (CeleXA) 10 MG tablet; Take 1 tablet by mouth Daily.   Dispense: 30 tablet; Refill: 3      Declines immunizations  [facility]  Colonoscopy and EGD have been performed within the last 2 years through gastroenterology  Continue calorie appropriate diet incorporate regular physical activity  Follow-up annually for preventative basis

## 2020-12-10 NOTE — PROGRESS NOTES
Zi Jasso is a 55 y.o. female.      Assessment/Plan   Problems Addressed this Visit        Cardiovascular and Mediastinum    Hyperlipidemia    Relevant Orders    C-reactive protein       Digestive    Gastroesophageal reflux disease without esophagitis    Relevant Medications    aluminum hydroxide-mag carbonate (GAVISCON EXTRA RELIEF) 160-105 MG chewable tablet chewable tablet    Nausea       Nervous and Auditory    Other chest pain - Primary       Other    Healthcare maintenance    Relevant Orders    Hepatitis C Antibody    C-reactive protein      Diagnoses       Codes Comments    Other chest pain    -  Primary ICD-10-CM: R07.89  ICD-9-CM: 786.59     Nausea     ICD-10-CM: R11.0  ICD-9-CM: 787.02     Gastroesophageal reflux disease without esophagitis     ICD-10-CM: K21.9  ICD-9-CM: 530.81     Healthcare maintenance     ICD-10-CM: Z00.00  ICD-9-CM: V70.0     Hyperlipidemia, unspecified hyperlipidemia type     ICD-10-CM: E78.5  ICD-9-CM: 272.4            Follow-up results of CRP for further cardiac risk assessment  Trial Celexa for underlying anxiety  Continue present present dose of Synthroid which is 6.25 mcg daily she has follow-up with endocrinology in February  45 minutes was spent face-to-face with patient greater than 50% of time discussing disease pathology progression and treatment plan  Consider trial of lipophilic statin atorvastatin or simvastatin  Return in about 1 month (around 1/10/2021), or if symptoms worsen or fail to improve, for Recheck.      Chief Complaint   Patient presents with   • transferring from Felipe Cedeno M.D.   • Annual Exam     Social History     Tobacco Use   • Smoking status: Never Smoker   • Smokeless tobacco: Never Used   Substance Use Topics   • Alcohol use: Yes     Comment: once a month   • Drug use: No       History of Present Illness   Patient new to this office to discuss chronic problems of abdominal and chest pain has had extensive evaluation from a cardiovascular and  "gastrointestinal standpoint followed by endocrinology as well for hyperlipidemia and hypothyroidism  She has had colonoscopy and upper endoscopy  Cardiac treadmill stress test  Does not have been reviewed and negative  She could not tolerate pravastatin or Crestor statin therapy both hydrophilic  Labs were reviewed with occluding hyperlipidemia with elevated LDL TSH and free T4 within normal limits  She has not had a history of mental health issues with either anxiety or depression, history of migraine headaches much less frequent recently  She does not exercise regularly at this point no significant change in bowel habits  Or change in weight    The following portions of the patient's history were reviewed and updated as appropriate:PMHroutine: Social history , Allergies, Current Medications, Active Problem List and Health Maintenance    Review of Systems   Constitutional: Negative.    HENT: Negative.    Respiratory: Positive for chest tightness.    Cardiovascular: Positive for chest pain.   Gastrointestinal: Positive for abdominal pain.   Genitourinary: Negative.    Musculoskeletal: Negative.    Skin: Negative.    Neurological: Negative.    Psychiatric/Behavioral: Positive for sleep disturbance. The patient is nervous/anxious.        Objective   Vitals:    12/10/20 0831   Pulse: 70   SpO2: 99%   Weight: 63 kg (138 lb 12.8 oz)   Height: 152.4 cm (60\")     Body mass index is 27.11 kg/m².  Physical Exam  Vitals signs and nursing note reviewed.   Constitutional:       Appearance: Normal appearance. She is well-developed and normal weight. She is not diaphoretic.   HENT:      Head: Normocephalic and atraumatic.      Right Ear: Tympanic membrane, ear canal and external ear normal.      Left Ear: Tympanic membrane, ear canal and external ear normal.   Eyes:      General: Lids are normal. No scleral icterus.     Extraocular Movements: Extraocular movements intact.      Conjunctiva/sclera: Conjunctivae normal.   Neck:      " Musculoskeletal: Normal range of motion.      Thyroid: No thyroid mass or thyromegaly.      Vascular: No carotid bruit or JVD.   Cardiovascular:      Rate and Rhythm: Normal rate and regular rhythm.      Pulses: Normal pulses.           Radial pulses are 2+ on the right side and 2+ on the left side.      Heart sounds: Normal heart sounds. No murmur.   Pulmonary:      Effort: Pulmonary effort is normal. No respiratory distress.      Breath sounds: Normal breath sounds.   Abdominal:      Palpations: Abdomen is soft.      Tenderness: There is no abdominal tenderness. There is no right CVA tenderness or left CVA tenderness.   Musculoskeletal:      Right lower leg: No edema.      Left lower leg: No edema.      Comments: mild trigger points lateral knee, humeral scapular and extensor elbow   Skin:     General: Skin is warm and dry.      Coloration: Skin is not pale.      Findings: No erythema or rash.   Neurological:      General: No focal deficit present.      Mental Status: She is alert and oriented to person, place, and time.      Sensory: No sensory deficit.      Deep Tendon Reflexes: Reflexes are normal and symmetric.   Psychiatric:         Mood and Affect: Mood normal.         Behavior: Behavior normal. Behavior is cooperative.         Thought Content: Thought content normal.         Judgment: Judgment normal.       Reviewed Data:  No visits with results within 1 Month(s) from this visit.   Latest known visit with results is:   Results Encounter on 10/14/2020   Component Date Value Ref Range Status   • Total Cholesterol 10/16/2020 270* 100 - 199 mg/dL Final   • Triglycerides 10/16/2020 79  0 - 149 mg/dL Final   • HDL Cholesterol 10/16/2020 64  >39 mg/dL Final   • VLDL Cholesterol [place] 10/16/2020 13  5 - 40 mg/dL Final   • LDL Chol Calc ([facility]) 10/16/2020 193* 0 - 99 mg/dL Final   • Glucose 10/16/2020 92  65 - 99 mg/dL Final   • BUN 10/16/2020 9  6 - 24 mg/dL Final   • Creatinine 10/16/2020 0.84  0.57 - 1.00 mg/dL Final    • eGFR Non African Am 10/16/2020 78  >59 mL/min/1.73 Final   • eGFR African Am 10/16/2020 90  >59 mL/min/1.73 Final   • BUN/Creatinine Ratio 10/16/2020 11  9 - 23 Final   • Sodium 10/16/2020 139  134 - 144 mmol/L Final   • Potassium 10/16/2020 4.4  3.5 - 5.2 mmol/L Final   • Chloride 10/16/2020 102  96 - 106 mmol/L Final   • Total CO2 10/16/2020 25  20 - 29 mmol/L Final   • Calcium 10/16/2020 9.8  8.7 - 10.2 mg/dL Final   • Total Protein 10/16/2020 6.9  6.0 - 8.5 g/dL Final   • Albumin 10/16/2020 4.4  3.8 - 4.9 g/dL Final   • Globulin 10/16/2020 2.5  1.5 - 4.5 g/dL Final   • A/G Ratio 10/16/2020 1.8  1.2 - 2.2 Final   • Total Bilirubin 10/16/2020 1.2  0.0 - 1.2 mg/dL Final   • Alkaline Phosphatase 10/16/2020 113  39 - 117 IU/L Final   • AST (SGOT) 10/16/2020 41* 0 - 40 IU/L Final   • ALT (SGPT) 10/16/2020 42* 0 - 32 IU/L Final   • Interpretation 10/16/2020 Note   Final    Supplemental report is available.

## 2021-01-13 ENCOUNTER — TELEPHONE (OUTPATIENT)
Dept: ENDOCRINOLOGY | Age: 56
End: 2021-01-13

## 2021-01-13 DIAGNOSIS — E06.3 HASHIMOTO'S DISEASE: ICD-10-CM

## 2021-01-13 DIAGNOSIS — E55.9 VITAMIN D DEFICIENCY: ICD-10-CM

## 2021-01-13 DIAGNOSIS — E78.5 HYPERLIPIDEMIA, UNSPECIFIED HYPERLIPIDEMIA TYPE: ICD-10-CM

## 2021-01-13 DIAGNOSIS — E03.9 ADULT HYPOTHYROIDISM: Primary | ICD-10-CM

## 2021-01-13 DIAGNOSIS — R68.89 ABNORMAL ENDOCRINE LABORATORY TEST FINDING: ICD-10-CM

## 2021-01-13 NOTE — TELEPHONE ENCOUNTER
PT CALLED WANTING NURSE TO PUT LAB ORDERS IN FOR HER SHE WANTS TO GO TO [facility] HER TO DO HER LABS. PT SAYS THAT SHE WOULD GO EITHER TOMORROW OR Friday.

## 2021-01-14 ENCOUNTER — TELEPHONE (OUTPATIENT)
Dept: ENDOCRINOLOGY | Age: 56
End: 2021-01-14

## 2021-01-14 NOTE — TELEPHONE ENCOUNTER
Patient called stated that she needed lab order faxed over to [facility]  On [address]   Faxed lab order to the number on web site [phone]

## 2021-01-15 LAB
ALBUMIN SERPL-MCNC: 4.5 G/DL (ref 3.8–4.9)
ALBUMIN/GLOB SERPL: 1.7 {RATIO} (ref 1.2–2.2)
ALP SERPL-CCNC: 99 IU/L (ref 39–117)
ALT SERPL-CCNC: 29 IU/L (ref 0–32)
AST SERPL-CCNC: 36 IU/L (ref 0–40)
BILIRUB SERPL-MCNC: 1.1 MG/DL (ref 0–1.2)
BUN SERPL-MCNC: 10 MG/DL (ref 6–24)
BUN/CREAT SERPL: 11 (ref 9–23)
CALCIUM SERPL-MCNC: 9.8 MG/DL (ref 8.7–10.2)
CHLORIDE SERPL-SCNC: 103 MMOL/L (ref 96–106)
CO2 SERPL-SCNC: 25 MMOL/L (ref 20–29)
CREAT SERPL-MCNC: 0.95 MG/DL (ref 0.57–1)
GLOBULIN SER CALC-MCNC: 2.6 G/DL (ref 1.5–4.5)
GLUCOSE SERPL-MCNC: 90 MG/DL (ref 65–99)
POTASSIUM SERPL-SCNC: 4.6 MMOL/L (ref 3.5–5.2)
PROT SERPL-MCNC: 7.1 G/DL (ref 6–8.5)
SODIUM SERPL-SCNC: 141 MMOL/L (ref 134–144)

## 2021-01-19 LAB
25(OH)D3+25(OH)D2 SERPL-MCNC: 27.7 NG/ML (ref 30–100)
CHOLEST SERPL-MCNC: 320 MG/DL (ref 100–199)
HDLC SERPL-MCNC: 66 MG/DL
INTERPRETATION: NORMAL
LDLC SERPL CALC-MCNC: 238 MG/DL (ref 0–99)
Lab: NORMAL
SPECIMEN STATUS: NORMAL
TRIGL SERPL-MCNC: 97 MG/DL (ref 0–149)
VLDLC SERPL CALC-MCNC: 16 MG/DL (ref 5–40)
WRITTEN AUTHORIZATION: NORMAL

## 2021-01-22 DIAGNOSIS — E06.3 HASHIMOTO'S DISEASE: ICD-10-CM

## 2021-01-22 DIAGNOSIS — E03.9 ADULT HYPOTHYROIDISM: Primary | ICD-10-CM

## 2021-01-25 ENCOUNTER — LAB (OUTPATIENT)
Dept: ENDOCRINOLOGY | Age: 56
End: 2021-01-25

## 2021-01-25 DIAGNOSIS — E06.3 HASHIMOTO'S DISEASE: ICD-10-CM

## 2021-01-25 DIAGNOSIS — E03.9 ADULT HYPOTHYROIDISM: ICD-10-CM

## 2021-01-26 LAB
T4 FREE SERPL-MCNC: 1.21 NG/DL (ref 0.93–1.7)
TSH SERPL DL<=0.005 MIU/L-ACNC: 1.42 UIU/ML (ref 0.27–4.2)

## 2021-01-28 ENCOUNTER — TELEPHONE (OUTPATIENT)
Dept: FAMILY MEDICINE CLINIC | Facility: CLINIC | Age: 56
End: 2021-01-28

## 2021-01-28 NOTE — TELEPHONE ENCOUNTER
She has a new primary care. The only thing listed on her allergy list is crestor reason being myalgia

## 2021-01-28 NOTE — TELEPHONE ENCOUNTER
Caller: Zi Jasso    Relationship to patient: Self    Best call back number: [phone]    Patient is needing: PATIENT ASKED FOR LASHAWN CRUZ TO PROVIDE HER A LIST OF MEDICATIONS SHE PRESCRIBED PATIENT FOR HIGH CHOLESTEROL AND THE SIDE EFFECTS.

## 2021-02-15 ENCOUNTER — TELEPHONE (OUTPATIENT)
Dept: INTERNAL MEDICINE | Facility: CLINIC | Age: 56
End: 2021-02-15

## 2021-02-15 NOTE — TELEPHONE ENCOUNTER
Patient is requesting to have lab results from 12/10 mailed to her at [address] [facility] [address]

## 2021-03-11 ENCOUNTER — TELEPHONE (OUTPATIENT)
Dept: INTERNAL MEDICINE | Facility: CLINIC | Age: 56
End: 2021-03-11

## 2021-03-11 NOTE — TELEPHONE ENCOUNTER
Patient is requesting to have lab results from 12/10 mailed to her home address at [address] [place], [place] [id number]  They requested back on 02/15 but so far they got nothing in the mail.

## 2021-03-15 NOTE — PROGRESS NOTES
Subjective   Zi Jasso is a 55 y.o. female.      F/u for hypothyroidism, hashimoto's disease, hyperlipidemia        Patient is a 55-year-old female who has consented to a video visit.  She is new to me.     She has primary hypothyroidism and is on Synthroid 125 mcg 1/2 tablet daily.  She has no history of goiter or head/neck radiation therapy.  Thyroid peroxidase antibody were elevated.  She has gained 6 lbs since 1/21.  She has no heat or cold intolerance.     She has hyperlipidemia and is on diet alone.  She did not start on pravastatin until her GI evaluation was completed.  She was on Crestor 40 mg/day in the past which was discontinued because it affected her concentration.  She has no history of diabetes mellitus.  Her father has hyperlipidemia and had a heart attack in his 60s.  He is using an injectable cholesterol medication.  Patient has no history of myocardial infarction, stroke or diabetes.  Her last meal was last night.    Lipid panel done in January 2021 are as follows: Cholesterol 320.  HDL 66.  .  Her 10-year risk of heart disease or stroke using American heart association calculator is 2.2%.    She had a natural menopause in her 40s.  She is not on hormone replacement therapy.  She had normal PAPS and mammogram done by her gynecologist Dr. Martin in January 2021.  She had a bone density many years ago in [place].    She has vit D insufficiency and is on vit D 5000 units/day.    She has intermittent upper abdominal pain and constipation and had a GI evaluation done by Dr. Jimenez.  She takes an OTC antacid at night.    The following portions of the patient's history were reviewed and updated as appropriate: allergies, current medications, past family history, past medical history, past social history, past surgical history and problem list.    Review of Systems   Respiratory: Negative for shortness of breath.    Cardiovascular: Negative for chest pain and palpitations.  "  Gastrointestinal: Positive for abdominal pain and constipation. Negative for anal bleeding and blood in stool.   Genitourinary: Negative.      Objective      Vitals:    03/22/21 1001   BP: 110/70   Weight: 61.3 kg (135 lb 3.2 oz)   Height: 152.4 cm (60\")     Physical Exam  Constitutional:       General: She is not in acute distress.     Appearance: Normal appearance. She is not ill-appearing, toxic-appearing or diaphoretic.   HENT:      Mouth/Throat:      Mouth: Mucous membranes are moist.      Pharynx: Oropharynx is clear.   Eyes:      General: No scleral icterus.        Right eye: No discharge.         Left eye: No discharge.      Extraocular Movements: Extraocular movements intact.      Conjunctiva/sclera: Conjunctivae normal.   Neck:      Vascular: No carotid bruit.   Cardiovascular:      Rate and Rhythm: Normal rate and regular rhythm.      Pulses: Normal pulses.      Heart sounds: Normal heart sounds. No murmur heard.   No friction rub. No gallop.    Pulmonary:      Effort: Pulmonary effort is normal. No respiratory distress.      Breath sounds: Normal breath sounds. No stridor. No wheezing, rhonchi or rales.   Chest:      Chest wall: No tenderness.   Abdominal:      General: Bowel sounds are normal. There is no distension.      Palpations: Abdomen is soft. There is no mass.      Tenderness: There is no abdominal tenderness. There is no right CVA tenderness or left CVA tenderness.      Hernia: No hernia is present.   Musculoskeletal:         General: Normal range of motion.      Cervical back: Normal range of motion and neck supple. No rigidity or tenderness.   Lymphadenopathy:      Cervical: No cervical adenopathy.   Skin:     General: Skin is warm and dry.   Neurological:      General: No focal deficit present.      Mental Status: She is alert and oriented to person, place, and time.      Cranial Nerves: No cranial nerve deficit.      Sensory: No sensory deficit.   Psychiatric:         Mood and Affect: " Mood normal.         Behavior: Behavior normal.       Orders Only on 01/25/2021   Component Date Value Ref Range Status   • Free T4 01/25/2021 1.21  0.93 - 1.70 ng/dL Final    Results may be falsely increased if patient taking Biotin.   • TSH 01/25/2021 1.420  0.270 - 4.200 uIU/mL Final     Assessment/Plan   Diagnoses and all orders for this visit:    1. Adult hypothyroidism (Primary)    2. Hyperlipidemia, unspecified hyperlipidemia type    3. Vitamin D insufficiency      Continue Synthroid 125 mcg/day.  Continue low-fat diet.  Continue vitamin D 5000 units/day.    Copy of my note sent to Dr. Nelson.    RTC 4 mos.

## 2021-03-22 ENCOUNTER — OFFICE VISIT (OUTPATIENT)
Dept: ENDOCRINOLOGY | Age: 56
End: 2021-03-22

## 2021-03-22 VITALS
HEIGHT: 60 IN | DIASTOLIC BLOOD PRESSURE: 70 MMHG | BODY MASS INDEX: 26.55 KG/M2 | SYSTOLIC BLOOD PRESSURE: 110 MMHG | WEIGHT: 135.2 LBS

## 2021-03-22 DIAGNOSIS — E78.5 HYPERLIPIDEMIA, UNSPECIFIED HYPERLIPIDEMIA TYPE: ICD-10-CM

## 2021-03-22 DIAGNOSIS — E03.9 ADULT HYPOTHYROIDISM: Primary | ICD-10-CM

## 2021-03-22 DIAGNOSIS — E55.9 VITAMIN D INSUFFICIENCY: ICD-10-CM

## 2021-03-22 PROCEDURE — 99214 OFFICE O/P EST MOD 30 MIN: CPT | Performed by: INTERNAL MEDICINE

## 2021-03-23 DIAGNOSIS — E05.80 IATROGENIC HYPERTHYROIDISM: ICD-10-CM

## 2021-03-23 DIAGNOSIS — E06.3 HASHIMOTO'S DISEASE: ICD-10-CM

## 2021-03-23 DIAGNOSIS — E03.9 ADULT HYPOTHYROIDISM: ICD-10-CM

## 2021-03-23 LAB
25(OH)D3+25(OH)D2 SERPL-MCNC: 42.8 NG/ML (ref 30–100)
ALBUMIN SERPL-MCNC: 4.4 G/DL (ref 3.8–4.9)
ALBUMIN/GLOB SERPL: 1.7 {RATIO} (ref 1.2–2.2)
ALP SERPL-CCNC: 98 IU/L (ref 39–117)
ALT SERPL-CCNC: 35 IU/L (ref 0–32)
AST SERPL-CCNC: 38 IU/L (ref 0–40)
BILIRUB SERPL-MCNC: 0.7 MG/DL (ref 0–1.2)
BUN SERPL-MCNC: 10 MG/DL (ref 6–24)
BUN/CREAT SERPL: 13 (ref 9–23)
CALCIUM SERPL-MCNC: 10.9 MG/DL (ref 8.7–10.2)
CHLORIDE SERPL-SCNC: 104 MMOL/L (ref 96–106)
CHOLEST SERPL-MCNC: 296 MG/DL (ref 100–199)
CO2 SERPL-SCNC: 25 MMOL/L (ref 20–29)
CREAT SERPL-MCNC: 0.75 MG/DL (ref 0.57–1)
GLOBULIN SER CALC-MCNC: 2.6 G/DL (ref 1.5–4.5)
GLUCOSE SERPL-MCNC: 85 MG/DL (ref 65–99)
HDLC SERPL-MCNC: 76 MG/DL
IMP & REVIEW OF LAB RESULTS: NORMAL
LDLC SERPL CALC-MCNC: 213 MG/DL (ref 0–99)
MAGNESIUM SERPL-MCNC: 2.2 MG/DL (ref 1.6–2.3)
POTASSIUM SERPL-SCNC: 4.5 MMOL/L (ref 3.5–5.2)
PROT SERPL-MCNC: 7 G/DL (ref 6–8.5)
SODIUM SERPL-SCNC: 143 MMOL/L (ref 134–144)
T3FREE SERPL-MCNC: 2.2 PG/ML (ref 2–4.4)
T4 FREE SERPL-MCNC: 1.42 NG/DL (ref 0.82–1.77)
TRIGL SERPL-MCNC: 54 MG/DL (ref 0–149)
TSH SERPL DL<=0.005 MIU/L-ACNC: 2.62 UIU/ML (ref 0.45–4.5)
VLDLC SERPL CALC-MCNC: 7 MG/DL (ref 5–40)

## 2021-03-23 RX ORDER — LEVOTHYROXINE SODIUM 125 MCG
62.5 TABLET ORAL DAILY
Qty: 45 TABLET | Refills: 2 | Status: SHIPPED | OUTPATIENT
Start: 2021-03-23 | End: 2021-12-13

## 2021-03-29 DIAGNOSIS — E78.5 HYPERLIPIDEMIA, UNSPECIFIED HYPERLIPIDEMIA TYPE: Primary | ICD-10-CM

## 2021-03-29 DIAGNOSIS — E83.52 HYPERCALCEMIA: ICD-10-CM

## 2021-03-29 PROBLEM — E05.80 IATROGENIC HYPERTHYROIDISM: Status: RESOLVED | Noted: 2018-08-15 | Resolved: 2021-03-29

## 2021-03-30 ENCOUNTER — BULK ORDERING (OUTPATIENT)
Dept: CASE MANAGEMENT | Facility: OTHER | Age: 56
End: 2021-03-30

## 2021-03-30 DIAGNOSIS — Z23 IMMUNIZATION DUE: ICD-10-CM

## 2021-03-31 ENCOUNTER — IMMUNIZATION (OUTPATIENT)
Dept: VACCINE CLINIC | Facility: HOSPITAL | Age: 56
End: 2021-03-31

## 2021-03-31 DIAGNOSIS — Z23 IMMUNIZATION DUE: ICD-10-CM

## 2021-03-31 PROCEDURE — 0001A: CPT | Performed by: INTERNAL MEDICINE

## 2021-03-31 PROCEDURE — 91300 HC SARSCOV02 VAC 30MCG/0.3ML IM: CPT | Performed by: INTERNAL MEDICINE

## 2021-04-05 ENCOUNTER — OFFICE VISIT (OUTPATIENT)
Dept: INTERNAL MEDICINE | Facility: CLINIC | Age: 56
End: 2021-04-05

## 2021-04-05 VITALS — WEIGHT: 135 LBS | BODY MASS INDEX: 26.37 KG/M2

## 2021-04-05 DIAGNOSIS — E83.52 HYPERCALCEMIA: ICD-10-CM

## 2021-04-05 DIAGNOSIS — R53.82 CHRONIC FATIGUE: ICD-10-CM

## 2021-04-05 DIAGNOSIS — K21.9 GASTROESOPHAGEAL REFLUX DISEASE WITHOUT ESOPHAGITIS: Primary | ICD-10-CM

## 2021-04-05 PROCEDURE — 99442 PR PHYS/QHP TELEPHONE EVALUATION 11-20 MIN: CPT | Performed by: FAMILY MEDICINE

## 2021-04-05 NOTE — PROGRESS NOTES
This visit has been rescheduled as a phone visit to comply with patient safety concerns in accordance with CDC recommendations. Total time of discussion was 20 minutes.  You have chosen to receive care through a telephone visit. Do you consent to use a telephone visit for your medical care today? Yes        Chief Complaint  Fatigue    Subjective          Zi Jasso presents to [facility]  History of Present Illness  Patient complains of persistent fatigue and has history of elevated blood sugars and hypothyroidism she takes antihistamines for allergies and nonsedating as well as nasal sprays  She has no chest pain shortness of breath or fatigue  Objective   Vital Signs:   Wt 61.2 kg (135 lb)   BMI 26.37 kg/m²     Physical Exam   Result Review :     Common labs    Common Labsle 3/22/21 3/22/21 4/6/21 4/6/21 4/9/21    1152 1152 0908 0908    Glucose    100 (A)    Glucose 85       BUN 10   10    Creatinine 0.75   0.74    eGFR Non  Am 89   81    eGFR African Am 103       Sodium 143   140    Potassium 4.5   4.2    Chloride 104   108 (A)    Calcium 10.9 (A)   9.4    Total Protein 7.0       Albumin 4.4   3.90    Total Bilirubin 0.7   1.2    Alkaline Phosphatase 98   76    AST (SGOT) 38   29    ALT (SGPT) 35 (A)   28    WBC   4.05     Hemoglobin   12.8     Hematocrit   38.8     Platelets   221     Total Cholesterol  296 (A)      Triglycerides  54      HDL Cholesterol  76      LDL Cholesterol   213 (A)      Hemoglobin A1C     5.16   (A) Abnormal value                      Assessment and Plan    Diagnoses and all orders for this visit:    1. Gastroesophageal reflux disease without esophagitis (Primary)  -     Comprehensive Metabolic Panel; Future    2. Chronic fatigue  -     CBC & Differential; Future  -     Comprehensive Metabolic Panel; Future    3. Hypercalcemia  -     PTH, Intact; Future  -     Comprehensive Metabolic Panel; Future    Follow-up results of blood work  I spent   20  minutes caring for Zi on this date of service. This time includes time spent by me in the following activities:preparing for the visit, counseling and educating the patient/family/caregiver and ordering medications, tests, or procedures  Follow Up   Return in about 1 week (around 4/12/2021), or if symptoms worsen or fail to improve, for Recheck.  Patient was given instructions and counseling regarding her condition or for health maintenance advice. Please see specific information pulled into the AVS if appropriate.

## 2021-04-06 ENCOUNTER — LAB (OUTPATIENT)
Dept: LAB | Facility: HOSPITAL | Age: 56
End: 2021-04-06

## 2021-04-06 DIAGNOSIS — K21.9 GASTROESOPHAGEAL REFLUX DISEASE WITHOUT ESOPHAGITIS: ICD-10-CM

## 2021-04-06 DIAGNOSIS — E83.52 HYPERCALCEMIA: ICD-10-CM

## 2021-04-06 DIAGNOSIS — R53.82 CHRONIC FATIGUE: ICD-10-CM

## 2021-04-06 LAB
ALBUMIN SERPL-MCNC: 3.9 G/DL (ref 3.5–5.2)
ALBUMIN/GLOB SERPL: 1.6 G/DL
ALP SERPL-CCNC: 76 U/L (ref 39–117)
ALT SERPL W P-5'-P-CCNC: 28 U/L (ref 1–33)
ANION GAP SERPL CALCULATED.3IONS-SCNC: 7.1 MMOL/L (ref 5–15)
AST SERPL-CCNC: 29 U/L (ref 1–32)
BASOPHILS # BLD AUTO: 0.02 10*3/MM3 (ref 0–0.2)
BASOPHILS NFR BLD AUTO: 0.5 % (ref 0–1.5)
BILIRUB SERPL-MCNC: 1.2 MG/DL (ref 0–1.2)
BUN SERPL-MCNC: 10 MG/DL (ref 6–20)
BUN/CREAT SERPL: 13.5 (ref 7–25)
CALCIUM SPEC-SCNC: 9.4 MG/DL (ref 8.6–10.5)
CHLORIDE SERPL-SCNC: 108 MMOL/L (ref 98–107)
CO2 SERPL-SCNC: 24.9 MMOL/L (ref 22–29)
CREAT SERPL-MCNC: 0.74 MG/DL (ref 0.57–1)
DEPRECATED RDW RBC AUTO: 42 FL (ref 37–54)
EOSINOPHIL # BLD AUTO: 0.24 10*3/MM3 (ref 0–0.4)
EOSINOPHIL NFR BLD AUTO: 5.9 % (ref 0.3–6.2)
ERYTHROCYTE [DISTWIDTH] IN BLOOD BY AUTOMATED COUNT: 13 % (ref 12.3–15.4)
GFR SERPL CREATININE-BSD FRML MDRD: 81 ML/MIN/1.73
GLOBULIN UR ELPH-MCNC: 2.4 GM/DL
GLUCOSE SERPL-MCNC: 100 MG/DL (ref 65–99)
HCT VFR BLD AUTO: 38.8 % (ref 34–46.6)
HGB BLD-MCNC: 12.8 G/DL (ref 12–15.9)
IMM GRANULOCYTES # BLD AUTO: 0 10*3/MM3 (ref 0–0.05)
IMM GRANULOCYTES NFR BLD AUTO: 0 % (ref 0–0.5)
LYMPHOCYTES # BLD AUTO: 1.23 10*3/MM3 (ref 0.7–3.1)
LYMPHOCYTES NFR BLD AUTO: 30.4 % (ref 19.6–45.3)
MCH RBC QN AUTO: 29.2 PG (ref 26.6–33)
MCHC RBC AUTO-ENTMCNC: 33 G/DL (ref 31.5–35.7)
MCV RBC AUTO: 88.4 FL (ref 79–97)
MONOCYTES # BLD AUTO: 0.47 10*3/MM3 (ref 0.1–0.9)
MONOCYTES NFR BLD AUTO: 11.6 % (ref 5–12)
NEUTROPHILS NFR BLD AUTO: 2.09 10*3/MM3 (ref 1.7–7)
NEUTROPHILS NFR BLD AUTO: 51.6 % (ref 42.7–76)
NRBC BLD AUTO-RTO: 0 /100 WBC (ref 0–0.2)
PLATELET # BLD AUTO: 221 10*3/MM3 (ref 140–450)
PMV BLD AUTO: 9.8 FL (ref 6–12)
POTASSIUM SERPL-SCNC: 4.2 MMOL/L (ref 3.5–5.2)
PROT SERPL-MCNC: 6.3 G/DL (ref 6–8.5)
PTH-INTACT SERPL-MCNC: 47.5 PG/ML (ref 15–65)
RBC # BLD AUTO: 4.39 10*6/MM3 (ref 3.77–5.28)
SODIUM SERPL-SCNC: 140 MMOL/L (ref 136–145)
WBC # BLD AUTO: 4.05 10*3/MM3 (ref 3.4–10.8)

## 2021-04-06 PROCEDURE — 83970 ASSAY OF PARATHORMONE: CPT

## 2021-04-06 PROCEDURE — 80053 COMPREHEN METABOLIC PANEL: CPT

## 2021-04-06 PROCEDURE — 36415 COLL VENOUS BLD VENIPUNCTURE: CPT

## 2021-04-06 PROCEDURE — 85025 COMPLETE CBC W/AUTO DIFF WBC: CPT

## 2021-04-07 ENCOUNTER — TELEPHONE (OUTPATIENT)
Dept: INTERNAL MEDICINE | Facility: CLINIC | Age: 56
End: 2021-04-07

## 2021-04-07 NOTE — TELEPHONE ENCOUNTER
Caller: Zi Jasso    Relationship: Self    Best call back number: [phone]    Caller requesting test results: PATIENT    What test was performed: LABS    When was the test performed: 04/06    Where was the test performed: IN OFFICE    Additional notes: PATIENT WOULD LIKE TO GO OVER THE LAB RESULTS WITH DR BANSAL TODAY OR TOMORROW.  PATIENT GOING ON VACATION Saturday AND WOULD LIKE TO DISCUSS PRIOR TO LEAVING.

## 2021-04-07 NOTE — TELEPHONE ENCOUNTER
PT Name: Jian Arrieta  MR #: [id number]     Physician Query Form - Documentation Clarification      CDS/: Glenys Edwards  RN CCDS             Contact information:[email or web address]    This form is a permanent document in the medical record.     Query Date: May 31, 2019    By submitting this query, we are merely seeking further clarification of documentation. Please utilize your independent clinical judgment when addressing the question(s) below.    The Medical record reflects the following:    Supporting Clinical Findings Location in Medical Record     Acute renal failure with specified lesion       PN 5/24-5/29     Patient has multiple risk factors for CKD with HTN, CAD/CHF, Obesity, DM and risk factors for acute worsening with fluid shifts related to repeat paracenteses in the setting of likely prior JENNY.         PN 5/24-5/29                                                                            Please further specify the acute renal failure with specified lesion.  Thank you.    Provider Use Only      [    ]  JENNY (contrast induced nephropathy)    [    ] with specified lesion    [    ] Other, Specify______________________    [  x  ] Unspecified                                                                                                                     [  ] Clinically Undetermined                Pt contacted and read results note.  Pt is concerned for her glucose level that was elevated even though she was fasting.  She wants to know if you can check an A1C.

## 2021-04-07 NOTE — TELEPHONE ENCOUNTER
"Patient notified and expressed understanding.  She said that she would still like to have the test \"just to make sure\".  Please advise.   "

## 2021-04-08 DIAGNOSIS — Z00.00 HEALTHCARE MAINTENANCE: Primary | ICD-10-CM

## 2021-04-09 ENCOUNTER — LAB (OUTPATIENT)
Dept: LAB | Facility: HOSPITAL | Age: 56
End: 2021-04-09

## 2021-04-09 ENCOUNTER — TELEPHONE (OUTPATIENT)
Dept: INTERNAL MEDICINE | Facility: CLINIC | Age: 56
End: 2021-04-09

## 2021-04-09 DIAGNOSIS — K64.9 HEMORRHOIDS, UNSPECIFIED HEMORRHOID TYPE: Primary | ICD-10-CM

## 2021-04-09 LAB — HBA1C MFR BLD: 5.16 % (ref 4.8–5.6)

## 2021-04-09 PROCEDURE — 83036 HEMOGLOBIN GLYCOSYLATED A1C: CPT | Performed by: FAMILY MEDICINE

## 2021-04-09 PROCEDURE — 36415 COLL VENOUS BLD VENIPUNCTURE: CPT | Performed by: FAMILY MEDICINE

## 2021-04-09 NOTE — TELEPHONE ENCOUNTER
Caller: Zi Jasso    Relationship to patient: Self    Best call back number: [phone]     Patient is needing: PATIENT STATES SHE IS NEEDING TO SEE COLON SURGEON FOR HEMORRHOIDS. PATIENT ALSO WANTED TO LET OFFICE KNOW SHE DID GET BLOOD WORK DONE TODAY.    PATIENT IS REQUESTING A CALLBACK TO LET HER KNOW IF REFERRAL CAN OR CANNOT BE PLACED.

## 2021-04-09 NOTE — TELEPHONE ENCOUNTER
Patient wants to see Dr. Cynthia Peñaloza at [facility] she is a colon surgeon

## 2021-04-21 ENCOUNTER — IMMUNIZATION (OUTPATIENT)
Dept: VACCINE CLINIC | Facility: HOSPITAL | Age: 56
End: 2021-04-21

## 2021-04-21 PROCEDURE — 0002A: CPT | Performed by: INTERNAL MEDICINE

## 2021-04-21 PROCEDURE — 91300 HC SARSCOV02 VAC 30MCG/0.3ML IM: CPT | Performed by: INTERNAL MEDICINE

## 2021-04-29 ENCOUNTER — OFFICE VISIT (OUTPATIENT)
Dept: INTERNAL MEDICINE | Facility: CLINIC | Age: 56
End: 2021-04-29

## 2021-04-29 ENCOUNTER — LAB (OUTPATIENT)
Dept: LAB | Facility: HOSPITAL | Age: 56
End: 2021-04-29

## 2021-04-29 VITALS
HEART RATE: 77 BPM | HEIGHT: 60 IN | OXYGEN SATURATION: 99 % | BODY MASS INDEX: 26.52 KG/M2 | SYSTOLIC BLOOD PRESSURE: 110 MMHG | DIASTOLIC BLOOD PRESSURE: 76 MMHG | WEIGHT: 135.1 LBS

## 2021-04-29 DIAGNOSIS — R53.82 CHRONIC FATIGUE: ICD-10-CM

## 2021-04-29 DIAGNOSIS — R07.89 OTHER CHEST PAIN: ICD-10-CM

## 2021-04-29 DIAGNOSIS — E03.9 ADULT HYPOTHYROIDISM: ICD-10-CM

## 2021-04-29 DIAGNOSIS — R06.02 SHORTNESS OF BREATH: Primary | ICD-10-CM

## 2021-04-29 DIAGNOSIS — R79.89 ELEVATED D-DIMER: ICD-10-CM

## 2021-04-29 LAB — D DIMER PPP FEU-MCNC: 0.74 MCGFEU/ML (ref 0–0.49)

## 2021-04-29 PROCEDURE — 85379 FIBRIN DEGRADATION QUANT: CPT | Performed by: FAMILY MEDICINE

## 2021-04-29 PROCEDURE — 36415 COLL VENOUS BLD VENIPUNCTURE: CPT | Performed by: FAMILY MEDICINE

## 2021-04-29 PROCEDURE — 99214 OFFICE O/P EST MOD 30 MIN: CPT | Performed by: FAMILY MEDICINE

## 2021-04-29 NOTE — PROGRESS NOTES
"Chief Complaint  Dry Mouth, Shaking, Fatigue, feels weak, and Headache    Subjective          Zi Jasso presents to [facility]  History of Present Illness  Patient complains of easily fatigued over the last year but worsening over the last couple months she did very active with her job traveling a lot by car she seems that she gets a little bit of shortness of breath with exertion she had a treadmill stress test in 2018 which was negative she does not take headache medicine very often she does not even take any antihistamines recently she feels she is gets a good night sleep recent blood work including thyroid function tests are normal.  Complain of some intermittent leg pain over the last month as well.  She has elevated cholesterol with statin intolerance  Objective   Vital Signs:   /76 (BP Location: Right arm, Patient Position: Sitting, Cuff Size: Adult)   Pulse 77   Ht 152.4 cm (60\")   Wt 61.3 kg (135 lb 1.6 oz)   SpO2 99%   BMI 26.38 kg/m²     Physical Exam  Constitutional:       Appearance: Normal appearance. She is normal weight.   HENT:      Right Ear: Tympanic membrane, ear canal and external ear normal.      Left Ear: Tympanic membrane, ear canal and external ear normal.      Mouth/Throat:      Pharynx: Oropharynx is clear.   Eyes:      General: No scleral icterus.  Cardiovascular:      Rate and Rhythm: Normal rate and regular rhythm.      Pulses: Normal pulses.      Heart sounds: Normal heart sounds.   Pulmonary:      Effort: Pulmonary effort is normal.      Breath sounds: Normal breath sounds.   Musculoskeletal:      Right lower leg: No edema.      Left lower leg: No edema.   Neurological:      Mental Status: She is alert.   Psychiatric:         Mood and Affect: Mood normal.         Behavior: Behavior normal.         Thought Content: Thought content normal.         Judgment: Judgment normal.        Result Review :     Common labs    Common Labsle 3/22/21 " 3/22/21 4/6/21 4/6/21 4/9/21    1152 1152 0908 0908    Glucose    100 (A)    Glucose 85       BUN 10   10    Creatinine 0.75   0.74    eGFR Non  Am 89   81    eGFR African Am 103       Sodium 143   140    Potassium 4.5   4.2    Chloride 104   108 (A)    Calcium 10.9 (A)   9.4    Total Protein 7.0       Albumin 4.4   3.90    Total Bilirubin 0.7   1.2    Alkaline Phosphatase 98   76    AST (SGOT) 38   29    ALT (SGPT) 35 (A)   28    WBC   4.05     Hemoglobin   12.8     Hematocrit   38.8     Platelets   221     Total Cholesterol  296 (A)      Triglycerides  54      HDL Cholesterol  76      LDL Cholesterol   213 (A)      Hemoglobin A1C     5.16   (A) Abnormal value            Data reviewed: Consultant notes Endocrinology hypothyroid treatment          Assessment and Plan    Diagnoses and all orders for this visit:    1. Shortness of breath (Primary)  -     Adult Stress Echo W/ Cont or Stress Agent if Necessary Per Protocol; Future  -     D-dimer, Quantitative    2. Other chest pain    3. Adult hypothyroidism    4. Chronic fatigue    5. Elevated d-dimer  -     Duplex Venous Lower Extremity - Right CAR; Future    Follow-up results of blood work  I spent 35   minutes caring for Zi on this date of service. This time includes time spent by me in the following activities:preparing for the visit, performing a medically appropriate examination and/or evaluation , counseling and educating the patient/family/caregiver, ordering medications, tests, or procedures and documenting information in the medical record  Follow Up   Return if symptoms worsen or fail to improve, for Recheck.  Patient was given instructions and counseling regarding her condition or for health maintenance advice. Please see specific information pulled into the AVS if appropriate.

## 2021-04-30 ENCOUNTER — HOSPITAL ENCOUNTER (OUTPATIENT)
Dept: CARDIOLOGY | Facility: HOSPITAL | Age: 56
Discharge: HOME | End: 2021-04-30
Admitting: FAMILY MEDICINE

## 2021-04-30 DIAGNOSIS — R79.89 ELEVATED D-DIMER: ICD-10-CM

## 2021-04-30 LAB
BH CV LOWER VASCULAR LEFT COMMON FEMORAL AUGMENT: NORMAL
BH CV LOWER VASCULAR LEFT COMMON FEMORAL COMPETENT: NORMAL
BH CV LOWER VASCULAR LEFT COMMON FEMORAL COMPRESS: NORMAL
BH CV LOWER VASCULAR LEFT COMMON FEMORAL PHASIC: NORMAL
BH CV LOWER VASCULAR LEFT COMMON FEMORAL SPONT: NORMAL
BH CV LOWER VASCULAR RIGHT COMMON FEMORAL AUGMENT: NORMAL
BH CV LOWER VASCULAR RIGHT COMMON FEMORAL COMPETENT: NORMAL
BH CV LOWER VASCULAR RIGHT COMMON FEMORAL COMPRESS: NORMAL
BH CV LOWER VASCULAR RIGHT COMMON FEMORAL PHASIC: NORMAL
BH CV LOWER VASCULAR RIGHT COMMON FEMORAL SPONT: NORMAL
BH CV LOWER VASCULAR RIGHT DISTAL FEMORAL COMPRESS: NORMAL
BH CV LOWER VASCULAR RIGHT GASTRONEMIUS COMPRESS: NORMAL
BH CV LOWER VASCULAR RIGHT GREATER SAPH AK COMPRESS: NORMAL
BH CV LOWER VASCULAR RIGHT GREATER SAPH BK COMPRESS: NORMAL
BH CV LOWER VASCULAR RIGHT LESSER SAPH COMPRESS: NORMAL
BH CV LOWER VASCULAR RIGHT MID FEMORAL AUGMENT: NORMAL
BH CV LOWER VASCULAR RIGHT MID FEMORAL COMPETENT: NORMAL
BH CV LOWER VASCULAR RIGHT MID FEMORAL COMPRESS: NORMAL
BH CV LOWER VASCULAR RIGHT MID FEMORAL PHASIC: NORMAL
BH CV LOWER VASCULAR RIGHT MID FEMORAL SPONT: NORMAL
BH CV LOWER VASCULAR RIGHT PERONEAL COMPRESS: NORMAL
BH CV LOWER VASCULAR RIGHT POPLITEAL AUGMENT: NORMAL
BH CV LOWER VASCULAR RIGHT POPLITEAL COMPETENT: NORMAL
BH CV LOWER VASCULAR RIGHT POPLITEAL COMPRESS: NORMAL
BH CV LOWER VASCULAR RIGHT POPLITEAL PHASIC: NORMAL
BH CV LOWER VASCULAR RIGHT POPLITEAL SPONT: NORMAL
BH CV LOWER VASCULAR RIGHT POSTERIOR TIBIAL COMPRESS: NORMAL
BH CV LOWER VASCULAR RIGHT PROFUNDA FEMORAL COMPRESS: NORMAL
BH CV LOWER VASCULAR RIGHT PROXIMAL FEMORAL COMPRESS: NORMAL
BH CV LOWER VASCULAR RIGHT SAPHENOFEMORAL JUNCTION COMPRESS: NORMAL

## 2021-04-30 PROCEDURE — 93971 EXTREMITY STUDY: CPT

## 2021-05-21 NOTE — TELEPHONE ENCOUNTER
Called pt and advised of Dr Jimenez's note. Pt verb understanding.   Today's Plan:   - Restart Crestor at 20 mg nightly (half the original dose).   - In case your leg discomfort is from vein leakage, try light compression socks (athletic socks or compression socks with the least amount of compression - pharmacist can help you find these) and also leg elevation during the day. If that doesn't help, we can do a scan of the legs to confirm the vein leaking and then consider a procedure (ablation) to fix the problem.   - Try to increase the amount of walking during the day.   - Please also follow up with your PCP regarding the discomfort, as they may want to consider other testing or medications.   - Let's check the cholesterol in 2-3 months to ensure it is well-controlled on the lower dose of Crestor.     If you have questions or concerns please call my nurse team at [phone].  Scheduling phone number: [phone]  For after hours urgent concerns call [phone] option 2.   Reminder: Please bring in all current medications, over the counter supplements and vitamin bottles to your next appointment.    It was a pleasure seeing you today!     Cherie Queen PA-C

## 2021-05-25 ENCOUNTER — TELEPHONE (OUTPATIENT)
Dept: INTERNAL MEDICINE | Facility: CLINIC | Age: 56
End: 2021-05-25

## 2021-05-25 NOTE — TELEPHONE ENCOUNTER
PATIENT CALLED AND STATED THAT SHE ESTABLISHED CARE WITH DR. BANSAL ON 12/10/20, AND ALSO SHOULD HAVE BEEN A PHYSICAL. THE PATIENT IS GETTING READY TO START A NEW JOB AND HER EMPLOYER NEEDS DOCUMENTATION THAT SHE HAS HAD A PHYSICAL. THE PATIENT IS REQUESTING A CALLBACK.    PATIENT'S CALLBACK: [phone]

## 2021-05-25 NOTE — TELEPHONE ENCOUNTER
"Yes it should be in her MyChart after visit summaries from that day.  I tried to call but it said \"call rejected\" and hung up.  Hub can relay.  "

## 2021-05-28 ENCOUNTER — OFFICE VISIT (OUTPATIENT)
Dept: INTERNAL MEDICINE | Facility: CLINIC | Age: 56
End: 2021-05-28

## 2021-05-28 VITALS
HEIGHT: 60 IN | DIASTOLIC BLOOD PRESSURE: 72 MMHG | HEART RATE: 89 BPM | SYSTOLIC BLOOD PRESSURE: 102 MMHG | BODY MASS INDEX: 27.01 KG/M2 | OXYGEN SATURATION: 99 % | RESPIRATION RATE: 16 BRPM | WEIGHT: 137.6 LBS

## 2021-05-28 DIAGNOSIS — Z00.00 HEALTHCARE MAINTENANCE: Primary | ICD-10-CM

## 2021-05-28 DIAGNOSIS — Z91.09 ENVIRONMENTAL ALLERGIES: ICD-10-CM

## 2021-05-28 PROBLEM — N39.0 URINARY TRACT INFECTION WITH HEMATURIA: Status: RESOLVED | Noted: 2018-04-03 | Resolved: 2021-05-28

## 2021-05-28 PROBLEM — R12 HEARTBURN: Status: RESOLVED | Noted: 2018-09-04 | Resolved: 2021-05-28

## 2021-05-28 PROBLEM — R13.19 ESOPHAGEAL DYSPHAGIA: Status: RESOLVED | Noted: 2018-09-04 | Resolved: 2021-05-28

## 2021-05-28 PROBLEM — E83.52 HYPERCALCEMIA: Status: RESOLVED | Noted: 2021-04-05 | Resolved: 2021-05-28

## 2021-05-28 PROBLEM — R31.9 URINARY TRACT INFECTION WITH HEMATURIA: Status: RESOLVED | Noted: 2018-04-03 | Resolved: 2021-05-28

## 2021-05-28 PROBLEM — K59.00 CONSTIPATION: Status: RESOLVED | Noted: 2018-06-21 | Resolved: 2021-05-28

## 2021-05-28 PROBLEM — R42 DIZZINESS: Status: RESOLVED | Noted: 2019-01-28 | Resolved: 2021-05-28

## 2021-05-28 PROBLEM — R11.0 NAUSEA: Status: RESOLVED | Noted: 2018-06-21 | Resolved: 2021-05-28

## 2021-05-28 PROCEDURE — 99396 PREV VISIT EST AGE 40-64: CPT | Performed by: FAMILY MEDICINE

## 2021-05-28 NOTE — PROGRESS NOTES
Subjective   Zi Jasso is a 55 y.o. female.     Chief Complaint   Patient presents with   • Annual Exam     Health maintenance    History of Present Illness   Zi Jasso 55 y.o. female who presents for an Annual Wellness Visit.  she has a history of   Patient Active Problem List   Diagnosis   • Adult hypothyroidism   • Environmental allergies   • Migraine without status migrainosus, not intractable   • Gastroesophageal reflux disease without esophagitis   • Chest pain   • Hyperlipidemia   • Hashimoto's disease   • Chronic fatigue   • Other chest pain   • Healthcare maintenance   • Vitamin D insufficiency   • Shortness of breath   .  she has been feeling fairly well.  Labs results discussed in detail with the patient.  Plan to update vaccines if needed today.  I  reviewed health maintenance with her as part of my preventative care plan.    Health Habits:  Dental Exam. up to date  Eye Exam. up to date  Exercise: 0 times/week.  Current exercise activities include: walking      The following portions of the patient's history were reviewed and updated as appropriate: allergies, current medications, past family history, past medical history, past social history, past surgical history and problem list.    Review of Systems   Constitutional: Negative for appetite change, fever and unexpected weight change.   HENT: Negative for ear pain, facial swelling and sore throat.    Eyes: Negative for pain and visual disturbance.   Respiratory: Negative for chest tightness, shortness of breath and wheezing.    Cardiovascular: Negative for chest pain and palpitations.   Gastrointestinal: Negative for abdominal pain and blood in stool.   Endocrine: Negative.    Genitourinary: Negative for difficulty urinating and hematuria.   Musculoskeletal: Negative for joint swelling.   Neurological: Negative for tremors, seizures and syncope.   Hematological: Negative for adenopathy.   Psychiatric/Behavioral: Negative.        Objective   Physical  Exam  Vitals and nursing note reviewed.   Constitutional:       Appearance: Normal appearance. She is well-developed. She is not diaphoretic.   HENT:      Head: Normocephalic and atraumatic.      Right Ear: Tympanic membrane, ear canal and external ear normal.      Left Ear: Tympanic membrane, ear canal and external ear normal.   Eyes:      General: Lids are normal. No scleral icterus.     Extraocular Movements: Extraocular movements intact.      Conjunctiva/sclera: Conjunctivae normal.   Neck:      Thyroid: No thyroid mass or thyromegaly.      Vascular: No carotid bruit or JVD.   Cardiovascular:      Rate and Rhythm: Normal rate and regular rhythm.      Pulses: Normal pulses.           Radial pulses are 2+ on the right side and 2+ on the left side.      Heart sounds: Normal heart sounds. No murmur heard.     Pulmonary:      Effort: Pulmonary effort is normal. No respiratory distress.      Breath sounds: Normal breath sounds.   Abdominal:      Palpations: Abdomen is soft.   Musculoskeletal:      Cervical back: Normal range of motion.      Right lower leg: No edema.      Left lower leg: No edema.   Skin:     General: Skin is warm and dry.      Coloration: Skin is not pale.      Findings: No erythema or rash.   Neurological:      General: No focal deficit present.      Mental Status: She is alert and oriented to person, place, and time.      Sensory: No sensory deficit.      Deep Tendon Reflexes: Reflexes are normal and symmetric.   Psychiatric:         Mood and Affect: Mood normal.         Behavior: Behavior normal. Behavior is cooperative.         Thought Content: Thought content normal.         Judgment: Judgment normal.         Assessment/Plan   Diagnoses and all orders for this visit:    1. Healthcare maintenance (Primary)    2. Environmental allergies      Continue healthy lifestyle calorie appropriate diet regular physical activity  Patient declines shingles vaccine as well as Tdap update

## 2021-06-04 ENCOUNTER — TELEPHONE (OUTPATIENT)
Dept: INTERNAL MEDICINE | Facility: CLINIC | Age: 56
End: 2021-06-04

## 2021-06-04 NOTE — TELEPHONE ENCOUNTER
[facility] assist called to let you know that she does not have insurance at this time and she is not able to have the echocardiogram done at this time.    I reached out to the patient to reiterate that any cardiac issues including active chest pains needs to be directed to the closest ER as soon as possible. When I spoke to patient she advised that she is ok and will have the echo done in August when she regains coverage.

## 2021-06-08 ENCOUNTER — APPOINTMENT (OUTPATIENT)
Dept: CARDIOLOGY | Facility: HOSPITAL | Age: 56
End: 2021-06-08

## 2021-09-09 DIAGNOSIS — E03.9 ADULT HYPOTHYROIDISM: ICD-10-CM

## 2021-09-09 DIAGNOSIS — E55.9 VITAMIN D INSUFFICIENCY: ICD-10-CM

## 2021-09-09 DIAGNOSIS — E78.5 HYPERLIPIDEMIA, UNSPECIFIED HYPERLIPIDEMIA TYPE: Primary | ICD-10-CM

## 2021-09-09 DIAGNOSIS — R68.89 ABNORMAL ENDOCRINE LABORATORY TEST FINDING: ICD-10-CM

## 2021-09-09 DIAGNOSIS — E06.3 HASHIMOTO'S DISEASE: ICD-10-CM

## 2021-12-03 ENCOUNTER — TELEPHONE (OUTPATIENT)
Dept: ENDOCRINOLOGY | Age: 56
End: 2021-12-03

## 2021-12-03 NOTE — TELEPHONE ENCOUNTER
12/3 pt called has moved to [place] / [phone] call  on Monday for the referral and see what records they need   Mary Velasquez or matt bassett

## 2021-12-06 NOTE — TELEPHONE ENCOUNTER
12/6 called and howard Joe @[facility] she is scheduled for 2/14 @1 with dr Reis / [place] [id number] [place] [address]10  Faxed last 2 ov notes and labs to [phone]

## 2021-12-12 DIAGNOSIS — E05.80 IATROGENIC HYPERTHYROIDISM: ICD-10-CM

## 2021-12-12 DIAGNOSIS — E03.9 ADULT HYPOTHYROIDISM: ICD-10-CM

## 2021-12-12 DIAGNOSIS — E06.3 HASHIMOTO'S DISEASE: ICD-10-CM

## 2021-12-13 RX ORDER — LEVOTHYROXINE SODIUM 125 MCG
TABLET ORAL
Qty: 45 TABLET | Refills: 0 | Status: SHIPPED | OUTPATIENT
Start: 2021-12-13

## 2024-04-03 ENCOUNTER — TELEPHONE (OUTPATIENT)
Dept: ENDOCRINOLOGY | Age: 59
End: 2024-04-03

## 2024-04-03 NOTE — TELEPHONE ENCOUNTER
4/3 called and sw pt she was driving told her to call back reg the # for YANY [phone]      Caller: Zi aJsso    Relationship: Self    Best call back number: [phone]     What form or medical record are you requesting: ALL OFFICE NOTES AND LABS    Who is requesting this form or medical record from you: MURPHY BRUNER PROVIDER, AZALIA DELGADOBILNAYA BRUNER    How would you like to receive the form or medical records (pick-up, mail, fax): FAX  If fax, what is the fax number: [phone]    Timeframe paperwork needed: ASAP

## 2024-04-03 NOTE — TELEPHONE ENCOUNTER
Caller: Zi Jasso    Relationship: Self    Best call back number: [phone]     What form or medical record are you requesting: ALL OFFICE NOTES AND LABS    Who is requesting this form or medical record from you: MURPHY BRUNER PROVIDER, AZALIA DELGADOBILNAYA BRUNER    How would you like to receive the form or medical records (pick-up, mail, fax): FAX  If fax, what is the fax number: [phone]    Timeframe paperwork needed: ASAP

## (undated) DEVICE — TUBING, SUCTION, 1/4" X 10', STRAIGHT: Brand: MEDLINE

## (undated) DEVICE — BITEBLOCK OMNI BLOC

## (undated) DEVICE — FRCP BX RADJAW4 NDL 2.8 240CM LG OG BX40

## (undated) DEVICE — Device: Brand: DEFENDO AIR/WATER/SUCTION AND BIOPSY VALVE

## (undated) DEVICE — CANNULA,ADULT,SOFT-TOUCH,7'TUBE,UC: Brand: PENDING